# Patient Record
Sex: MALE | Race: WHITE | URBAN - METROPOLITAN AREA
[De-identification: names, ages, dates, MRNs, and addresses within clinical notes are randomized per-mention and may not be internally consistent; named-entity substitution may affect disease eponyms.]

---

## 2017-04-21 ENCOUNTER — OFFICE VISIT (OUTPATIENT)
Dept: INTERNAL MEDICINE CLINIC | Age: 72
End: 2017-04-21

## 2017-04-21 VITALS
TEMPERATURE: 98.7 F | WEIGHT: 203 LBS | BODY MASS INDEX: 30.07 KG/M2 | HEIGHT: 69 IN | SYSTOLIC BLOOD PRESSURE: 143 MMHG | OXYGEN SATURATION: 97 % | RESPIRATION RATE: 18 BRPM | DIASTOLIC BLOOD PRESSURE: 77 MMHG | HEART RATE: 80 BPM

## 2017-04-21 DIAGNOSIS — F32.A DEPRESSION, UNSPECIFIED DEPRESSION TYPE: ICD-10-CM

## 2017-04-21 DIAGNOSIS — Z00.00 MEDICARE ANNUAL WELLNESS VISIT, SUBSEQUENT: ICD-10-CM

## 2017-04-21 DIAGNOSIS — E11.9 CONTROLLED TYPE 2 DIABETES MELLITUS WITHOUT COMPLICATION, WITHOUT LONG-TERM CURRENT USE OF INSULIN (HCC): ICD-10-CM

## 2017-04-21 DIAGNOSIS — I10 ESSENTIAL HYPERTENSION: Primary | ICD-10-CM

## 2017-04-21 RX ORDER — ASPIRIN 81 MG/1
TABLET ORAL DAILY
COMMUNITY
End: 2022-05-19

## 2017-04-21 RX ORDER — SERTRALINE HYDROCHLORIDE 50 MG/1
50 TABLET, FILM COATED ORAL DAILY
Qty: 30 TAB | Refills: 3 | Status: SHIPPED | OUTPATIENT
Start: 2017-04-21 | End: 2017-08-05 | Stop reason: SDUPTHER

## 2017-04-21 RX ORDER — AMLODIPINE BESYLATE 10 MG/1
10 TABLET ORAL DAILY
COMMUNITY
End: 2017-11-27 | Stop reason: SDUPTHER

## 2017-04-21 RX ORDER — MELATONIN
DAILY
COMMUNITY
End: 2020-11-23 | Stop reason: DRUGHIGH

## 2017-04-21 RX ORDER — SERTRALINE HYDROCHLORIDE 50 MG/1
TABLET, FILM COATED ORAL DAILY
COMMUNITY
End: 2017-04-21 | Stop reason: SDUPTHER

## 2017-04-21 RX ORDER — ALLOPURINOL 300 MG/1
300 TABLET ORAL DAILY
COMMUNITY
End: 2018-12-18 | Stop reason: SDUPTHER

## 2017-04-21 RX ORDER — LOSARTAN POTASSIUM AND HYDROCHLOROTHIAZIDE 12.5; 1 MG/1; MG/1
1 TABLET ORAL DAILY
COMMUNITY
End: 2017-11-27 | Stop reason: SDUPTHER

## 2017-04-21 RX ORDER — TESTOSTERONE 20.25 MG/1.25G
GEL TOPICAL DAILY
COMMUNITY
End: 2017-07-28 | Stop reason: SDUPTHER

## 2017-04-21 NOTE — PROGRESS NOTES
History:   Josias Farrar is a 73 y.o. male presenting today for an initial visit for Establish Care; Diabetes; Hypertension; and Annual Wellness Visit  He's been here a month so needs a new PCP. Is from [place]. He has a son who lives here. 1.  DM:  On Januvia. Down to 1/2 pill every other day. Got swelling in his face with metformin, so that was stopped. He was getting blood work every 3 months. He just lost almost 15 lbs. Last blood work was about 3 months ago. Doesn't check sugars at home. 2.  HTN:  Controlled with Norvasc and Hyzaar. He gets a little white coat HTN. [facility] and around 100/70.      3.  H/o colon CA:  Was surgically removed 12 years ago. Had chemo and radiation too. Last colonoscopy was last year. He thinks his next one is 5 years from then. 4.  Depression:  Was put on Zoloft by his oncologist.  He's still grieving his wife. He's doing ok and has a dog. Not worrying as much as he was. He denies SI.      5.  Hypogonadism:  He's taking Androgel. Has been on it for a number of years. He has been getting PSAs every 3 months, but not getting rectal exams and doesn't want those. 6.  Gout:  On allopurinol with good results. Was on 300 mg every day. Then lowered to 1/2 pill 3 times/week. 7.  H/O CRI:  Therefore on lower doses of medications. Saw a nephrologist once. Told him not to take Aleve. Review of Systems   Constitutional: Negative. HENT: Negative. Eyes:        Has h/o cataracts and torn retina   Respiratory: Negative. Cardiovascular: Negative. Gastrointestinal: Negative. Genitourinary: Positive for frequency (nocturia x2-3). Musculoskeletal: Positive for joint pain (a little in hands). Skin: Negative. Neurological: Negative.     Psychiatric/Behavioral:        Grieving       Past Medical History:   Diagnosis Date    [facility]     Diabetes ([facility]     Hypertension        Past Surgical History:   Procedure Laterality Date    ABDOMEN SURGERY PROC UNLISTED      HX COLONOSCOPY         Social History     Social History    Marital status:      Spouse name: N/A    Number of children: N/A    Years of education: N/A     Occupational History    Not on file. Social History Main Topics    Smoking status: Never Smoker    Smokeless tobacco: Never Used    Alcohol use No    Drug use: Not on file    Sexual activity: No     Other Topics Concern    Not on file     Social History Narrative    No narrative on file       Family History   Problem Relation Age of Onset    Cancer Mother     No Known Problems Father        No current outpatient prescriptions on file prior to visit. No current facility-administered medications on file prior to visit. No Known Allergies    Objective:   VS:    Visit Vitals    /77 (BP 1 Location: Left arm, BP Patient Position: Sitting)    Pulse 80    Temp 98.7 °F (37.1 °C) (Oral)    Resp 18    Ht 5' 9\" (1.753 m)    Wt 203 lb (92.1 kg)    SpO2 97%    BMI 29.98 kg/m2     Physical Exam   Constitutional: He is oriented to person, place, and time. He appears well-developed and well-nourished. No distress. HENT:   Head: Normocephalic and atraumatic. Right Ear: External ear normal.   Left Ear: External ear normal.   Nose: Nose normal.   Mouth/Throat: Oropharynx is clear and moist.   Eyes: Conjunctivae and EOM are normal. Pupils are equal, round, and reactive to light. No scleral icterus. Neck: Normal range of motion. Neck supple. No tracheal deviation present. No thyromegaly present. Cardiovascular: Normal rate, regular rhythm, normal heart sounds and intact distal pulses. Exam reveals no gallop and no friction rub. No murmur heard. Pulmonary/Chest: Effort normal and breath sounds normal. He has no wheezes. He has no rales. Abdominal: Soft. Bowel sounds are normal. He exhibits no distension. There is no hepatosplenomegaly. There is no tenderness. Musculoskeletal: Normal range of motion. He exhibits no edema or tenderness. Lymphadenopathy:     He has no cervical adenopathy. Neurological: He is alert and oriented to person, place, and time. Skin: Skin is warm and dry. No rash noted. No pallor. Psychiatric: He has a normal mood and affect. His behavior is normal. Judgment and thought content normal.   Nursing note and vitals reviewed. Assessment/ Plan:     Melchor Wilkins was seen today for establish care, diabetes, hypertension and annual wellness visit. Diagnoses and all orders for this visit:    Essential hypertension  -     METABOLIC PANEL, COMPREHENSIVE; Future  -     CBC WITH AUTOMATED DIFF; Future    Controlled type 2 diabetes mellitus without complication, without long-term current use of insulin (HCC)  -     HEMOGLOBIN A1C WITH EAG; Future  -     METABOLIC PANEL, COMPREHENSIVE; Future    Depression, unspecified depression type  -     sertraline (ZOLOFT) 50 mg tablet; Take 1 Tab by mouth daily. I have discussed the diagnosis with the patient and the intended plan as seen in the above orders. The patient verbalized understanding and agrees with the plan. Follow-up Disposition:  Return in about 3 months (around 7/21/2017) for combo clinic.     Mikael Head MD

## 2017-04-21 NOTE — PROGRESS NOTES
Medicare Wellness Visit  Kerline Akers is a 73 y.o. male and presents for annual Medicare Wellness Visit. Problem List: Reviewed with patient and discussed risk factors. There is no problem list on file for this patient. Current medical providers:  No care team member to display    PSH: Reviewed with patient  Past Surgical History:   Procedure Laterality Date    ABDOMEN SURGERY PROC UNLISTED      HX COLONOSCOPY          SH: Reviewed with patient  Social History   Substance Use Topics    Smoking status: Never Smoker    Smokeless tobacco: Never Used    Alcohol use No       FH: Reviewed with patient  Family History   Problem Relation Age of Onset    Cancer Mother     No Known Problems Father        Medications/Allergies: Reviewed with patient  No current outpatient prescriptions on file prior to visit. No current facility-administered medications on file prior to visit. No Known Allergies    Objective:  Visit Vitals    /77 (BP 1 Location: Left arm, BP Patient Position: Sitting)    Pulse 80    Temp 98.7 °F (37.1 °C) (Oral)    Resp 18    Ht 5' 9\" (1.753 m)    Wt 203 lb (92.1 kg)    SpO2 97%    BMI 29.98 kg/m2    Body mass index is 29.98 kg/(m^2). Assessment of cognitive impairment: Alert and oriented x 3    Depression Screen:   PHQ 2 / 9, over the last two weeks 4/21/2017   Little interest or pleasure in doing things Not at all   Feeling down, depressed or hopeless Not at all   Total Score PHQ 2 0       Fall Risk Assessment:    Fall Risk Assessment, last 12 mths 4/21/2017   Able to walk? Yes   Fall in past 12 months? No       Functional Ability:   Does the patient exhibit a steady gait? yes   How long did it take the patient to get up and walk from a sitting position? <10 sec   Is the patient self reliant?  (ie can do own laundry, meals, household chores)  yes     Does the patient handle his/her own medications? yes     Does the patient handle his/her own money?    yes Is the patients home safe (ie good lighting, handrails on stairs and bath, etc.)? yes     Did you notice or did patient express any hearing difficulties? no     Did you notice or did patient express any vision difficulties?   no     Were distance and reading eye charts used? no       Advance Care Planning:   Patient was offered the opportunity to discuss advance care planning:  yes     Does patient have an Advance Directive:  yes   If no, did you provide information on Caring Connections?  no   He will give us a copy. Plan:      Orders Placed This Encounter    amLODIPine (NORVASC) 10 mg tablet    losartan-hydroCHLOROthiazide (HYZAAR) 100-12.5 mg per tablet    allopurinol (ZYLOPRIM) 300 mg tablet    testosterone (ANDROGEL) 20.25 mg/1.25 gram (1.62 %) gel    SITagliptin (JANUVIA) 100 mg tablet    aspirin delayed-release 81 mg tablet    cholecalciferol (VITAMIN D3) 1,000 unit tablet    sertraline (ZOLOFT) 50 mg tablet   Will await old records to see about HM due    Health Maintenance   Topic Date Due    FOBT Q 1 YEAR AGE 50-75  08/31/2017 (Originally 8/9/1997)    Pneumococcal 65+ Low/Medium Risk (2 of 2 - PPSV23) 04/21/2018    MEDICARE YEARLY EXAM  04/22/2018    GLAUCOMA SCREENING Q2Y  11/14/2018    DTaP/Tdap/Td series (2 - Td) 04/21/2027    ZOSTER VACCINE AGE 60>  Addressed    INFLUENZA AGE 9 TO ADULT  Addressed       *Patient verbalized understanding and agreement with the plan. A copy of the After Visit Summary with personalized health plan was given to the patient today.

## 2017-04-21 NOTE — PATIENT INSTRUCTIONS

## 2017-04-21 NOTE — ACP (ADVANCE CARE PLANNING)
Advance Care Planning (ACP) Provider Conversation Snapshot    Date of ACP Conversation: 04/21/17  Persons included in Conversation:  patient  Length of ACP Conversation in minutes:  <16 minutes (Non-Billable)    Authorized Decision Maker (if patient is incapable of making informed decisions):    This person is:   self          For Patients with Decision Making Capacity:   Values/Goals: Exploration of values, goals, and preferences if recovery is not expected, even with continued medical treatment in the event of:  Imminent death    Conversation Outcomes / Follow-Up Plan:   he has ACP and will bring in

## 2017-04-21 NOTE — MR AVS SNAPSHOT
Visit Information Date & Time Provider Department Dept. Phone Encounter #  
 4/21/2017 11:15 AM Ortiz Garvey MD [facility]  Follow-up Instructions Return in about 3 months (around 7/21/2017) for combo clinic. Upcoming Health Maintenance Date Due FOBT Q 1 YEAR AGE 50-75 8/31/2017* Pneumococcal 65+ Low/Medium Risk (2 of 2 - PPSV23) 4/21/2018 MEDICARE YEARLY EXAM 4/22/2018 GLAUCOMA SCREENING Q2Y 11/14/2018 DTaP/Tdap/Td series (2 - Td) 4/21/2027 *Topic was postponed. The date shown is not the original due date. Allergies as of 4/21/2017  Review Complete On: 4/21/2017 By: Ortiz Garvey MD  
 No Known Allergies Current Immunizations  Never Reviewed No immunizations on file. Not reviewed this visit You Were Diagnosed With   
  
 Codes Comments Essential hypertension    -  Primary ICD-10-CM: I10 
ICD-9-CM: 401.9 Controlled type 2 diabetes mellitus without complication, without long-term current use of insulin [facility]     ICD-10-CM: E11.9 ICD-9-CM: 250.00 Depression, unspecified depression type     ICD-10-CM: F32.9 ICD-9-CM: 780 Medicare annual wellness visit, subsequent     ICD-10-CM: Z00.00 ICD-9-CM: V70.0 Vitals BP Pulse Temp Resp Height(growth percentile) Weight(growth percentile) 143/77 (BP 1 Location: Left arm, BP Patient Position: Sitting) 80 98.7 °F (37.1 °C) (Oral) 18 5' 9\" (1.753 m) 203 lb (92.1 kg) SpO2 BMI Smoking Status 97% 29.98 kg/m2 Never Smoker BMI and BSA Data Body Mass Index Body Surface Area  
 29.98 kg/m 2 2.12 m 2 Preferred Pharmacy [facility]acy Name Phone [facility] [address] [place] [address] [place] [facility] [phone] Your Updated Medication List  
  
   
This list is accurate as of: 4/21/17 12:22 PM.  Always use your most recent med list.  
  
  
  
  
 allopurinol 300 mg tablet Commonly known as:  Baldev Gagnon Take 300 mg by mouth daily. Indications: 1/2 tab three times a week  
  
 amLODIPine 10 mg tablet Commonly known as:  Scarlett Fast Take 10 mg by mouth daily. aspirin delayed-release 81 mg tablet Take  by mouth daily. JANUVIA 100 mg tablet Generic drug:  SITagliptin Take 100 mg by mouth daily. Indications: 1/2 pill every other day  
  
 losartan-hydroCHLOROthiazide 100-12.5 mg per tablet Commonly known as:  HYZAAR Take 1 Tab by mouth daily. sertraline 50 mg tablet Commonly known as:  ZOLOFT Take 1 Tab by mouth daily. testosterone 20.25 mg/1.25 gram (1.62 %) gel Commonly known as:  ANDROGEL  
by TransDERmal route daily. VITAMIN D3 1,000 unit tablet Generic drug:  cholecalciferol Take  by mouth daily. Prescriptions Sent to Pharmacy Refills  
 sertraline (ZOLOFT) 50 mg tablet 3 Sig: Take 1 Tab by mouth daily. Class: Normal  
 Pharmacy: [address] [place] [address], [address] [place] [address] Ph #: [phone] Route: Oral  
  
Follow-up Instructions Return in about 3 months (around 7/21/2017) for combo clinic. To-Do List   
 04/21/2017 Lab:  CBC WITH AUTOMATED DIFF   
  
 04/21/2017 Lab:  HEMOGLOBIN A1C WITH EAG   
  
 04/21/2017 Lab:  METABOLIC PANEL, COMPREHENSIVE Patient Instructions Learning About Diabetes Food Guidelines Your Care Instructions Meal planning is important to manage diabetes. It helps keep your blood sugar at a target level (which you set with your doctor). You don't have to eat special foods. You can eat what your family eats, including sweets once in a while. But you do have to pay attention to how often you eat and how much you eat of certain foods. You may want to work with a dietitian or a certified diabetes educator (CDE) to help you plan meals and snacks. A dietitian or CDE can also help you lose weight if that is one of your goals. What should you know about eating carbs? Managing the amount of carbohydrate (carbs) you eat is an important part of healthy meals when you have diabetes. Carbohydrate is found in many foods. · Learn which foods have carbs. And learn the amounts of carbs in different foods. ¨ Bread, cereal, pasta, and rice have about 15 grams of carbs in a serving. A serving is 1 slice of bread (1 ounce), ½ cup of cooked cereal, or 1/3 cup of cooked pasta or rice. ¨ Fruits have 15 grams of carbs in a serving. A serving is 1 small fresh fruit, such as an apple or orange; ½ of a banana; ½ cup of cooked or canned fruit; ½ cup of fruit juice; 1 cup of melon or raspberries; or 2 tablespoons of dried fruit. ¨ Milk and no-sugar-added yogurt have 15 grams of carbs in a serving. A serving is 1 cup of milk or 2/3 cup of no-sugar-added yogurt. ¨ Starchy vegetables have 15 grams of carbs in a serving. A serving is ½ cup of mashed potatoes or sweet potato; 1 cup winter squash; ½ of a small baked potato; ½ cup of cooked beans; or ½ cup cooked corn or green peas. · Learn how much carbs to eat each day and at each meal. A dietitian or CDE can teach you how to keep track of the amount of carbs you eat. This is called carbohydrate counting. · If you are not sure how to count carbohydrate grams, use the Plate Method to plan meals. It is a good, quick way to make sure that you have a balanced meal. It also helps you spread carbs throughout the day. ¨ Divide your plate by types of foods. Put non-starchy vegetables on half the plate, meat or other protein food on one-quarter of the plate, and a grain or starchy vegetable in the final quarter of the plate.  To this you can add a small piece of fruit and 1 cup of milk or yogurt, depending on how many carbs you are supposed to eat at a meal. 
· Try to eat about the same amount of carbs at each meal. Do not \"save up\" your daily allowance of carbs to eat at one meal. 
 · Proteins have very little or no carbs per serving. Examples of proteins are beef, chicken, turkey, fish, eggs, tofu, cheese, cottage cheese, and peanut butter. A serving size of meat is 3 ounces, which is about the size of a deck of cards. Examples of meat substitute serving sizes (equal to 1 ounce of meat) are 1/4 cup of cottage cheese, 1 egg, 1 tablespoon of peanut butter, and ½ cup of tofu. How can you eat out and still eat healthy? · Learn to estimate the serving sizes of foods that have carbohydrate. If you measure food at home, it will be easier to estimate the amount in a serving of restaurant food. · If the meal you order has too much carbohydrate (such as potatoes, corn, or baked beans), ask to have a low-carbohydrate food instead. Ask for a salad or green vegetables. · If you use insulin, check your blood sugar before and after eating out to help you plan how much to eat in the future. · If you eat more carbohydrate at a meal than you had planned, take a walk or do other exercise. This will help lower your blood sugar. What else should you know? · Limit saturated fat, such as the fat from meat and dairy products. This is a healthy choice because people who have diabetes are at higher risk of heart disease. So choose lean cuts of meat and nonfat or low-fat dairy products. Use olive or canola oil instead of butter or shortening when cooking. · Don't skip meals. Your blood sugar may drop too low if you skip meals and take insulin or certain medicines for diabetes. · Check with your doctor before you drink alcohol. Alcohol can cause your blood sugar to drop too low. Alcohol can also cause a bad reaction if you take certain diabetes medicines. Follow-up care is a key part of your treatment and safety. Be sure to make and go to all appointments, and call your doctor if you are having problems. It's also a good idea to know your test results and keep a list of the medicines you take. Where can you learn more? Go to http://yamila-jorge.info/. Enter O728 in the search box to learn more about \"Learning About Diabetes Food Guidelines. \" Current as of: May 23, 2016 Content Version: 11.2 © 9239-6512 [employer]. Care instructions adapted under license by [employer] (which disclaims liability or warranty for this information). If you have questions about a medical condition or this instruction, always ask your healthcare professional. [address] any warranty or liability for your use of this information. Introducing [facility]! Joy Marquis introduces [employer] patient portal. Now you can access parts of your medical record, email your doctor's office, and request medication refills online. 1. In your internet browser, go to [email or web address] 2. Click on the First Time User? Click Here link in the Sign In box. You will see the New Member Sign Up page. 3. Enter your [employer] Access Code exactly as it appears below. You will not need to use this code after youve completed the sign-up process. If you do not sign up before the expiration date, you must request a new code. · [employer] Access Code: [email or web address] Expires: 7/20/2017 12:22 PM 
 
4. Enter the last four digits of your Social Security Number (xxxx) and Date of Birth (mm/dd/yyyy) as indicated and click Submit. You will be taken to the next sign-up page. 5. Create a [employer]ent ID. This will be your [employer] login ID and cannot be changed, so think of one that is secure and easy to remember. 6. Create a [employer] password. You can change your password at any time. 7. Enter your Password Reset Question and Answer. This can be used at a later time if you forget your password. 8. Enter your e-mail address. You will receive e-mail notification when new information is available in [address] 9. Click Sign Up. You can now view and download portions of your medical record. 10. Click the Download Summary menu link to download a portable copy of your medical information. If you have questions, please visit the Frequently Asked Questions section of the [employer] website. Remember, [employer] is NOT to be used for urgent needs. For medical emergencies, dial 911. Now available from your iPhone and Android! Please provide this summary of care documentation to your next provider. If you have any questions after today's visit, please call [phone].

## 2017-04-21 NOTE — PROGRESS NOTES
Esme Varghese presents today at the clinic for      Chief Complaint   Patient presents with   [address] Diabetes    Hypertension    Annual Wellness Visit        Wt Readings from Last 3 Encounters:   04/21/17 203 lb (92.1 kg)     Temp Readings from Last 3 Encounters:   04/21/17 98.7 °F (37.1 °C) (Oral)     BP Readings from Last 3 Encounters:   04/21/17 143/77     Pulse Readings from Last 3 Encounters:   04/21/17 80       There are no preventive care reminders to display for this patient. Advance Directive:    1. Do you have an advance directive in place? Patient Reply: No    2. If not, would you like material regarding how to put one in place?   Patient Reply: Pt has one he will bring his copy        ADL Assessment 4/21/2017   Feeding yourself No Help Needed   Getting from bed to chair No Help Needed   Getting dressed No Help Needed   Bathing or showering No Help Needed   Walk across the room (includes cane/walker) No Help Needed   Using the telphone No Help Needed   Taking your medications No Help Needed   Preparing meals No Help Needed   Managing money (expenses/bills) No Help Needed   Moderately strenuous housework (laundry) No Help Needed   Shopping for personal items (toiletries/medicines) No Help Needed   Shopping for groceries No Help Needed   Driving No Help Needed   Climbing a flight of stairs No Help Needed   Getting to places beyond walking distances No Help Needed

## 2017-05-22 PROBLEM — M10.00 IDIOPATHIC GOUT: Status: ACTIVE | Noted: 2017-05-22

## 2017-05-22 PROBLEM — F51.01 PRIMARY INSOMNIA: Status: ACTIVE | Noted: 2017-05-22

## 2017-05-22 PROBLEM — E11.42 CONTROLLED TYPE 2 DIABETES MELLITUS WITH DIABETIC POLYNEUROPATHY, WITHOUT LONG-TERM CURRENT USE OF INSULIN (HCC): Status: ACTIVE | Noted: 2017-05-22

## 2017-05-22 PROBLEM — Z85.048 HISTORY OF RECTAL CANCER: Status: ACTIVE | Noted: 2017-05-22

## 2017-05-22 PROBLEM — N18.9 CKD (CHRONIC KIDNEY DISEASE): Status: ACTIVE | Noted: 2017-05-22

## 2017-05-22 PROBLEM — I10 ESSENTIAL HYPERTENSION: Status: ACTIVE | Noted: 2017-05-22

## 2017-05-22 PROBLEM — F32.9 REACTIVE DEPRESSION: Status: ACTIVE | Noted: 2017-05-22

## 2017-07-25 ENCOUNTER — OFFICE VISIT (OUTPATIENT)
Dept: INTERNAL MEDICINE CLINIC | Age: 72
End: 2017-07-25

## 2017-07-25 VITALS
HEART RATE: 83 BPM | SYSTOLIC BLOOD PRESSURE: 128 MMHG | BODY MASS INDEX: 29.18 KG/M2 | HEIGHT: 69 IN | TEMPERATURE: 97.6 F | WEIGHT: 197 LBS | RESPIRATION RATE: 18 BRPM | DIASTOLIC BLOOD PRESSURE: 82 MMHG | OXYGEN SATURATION: 96 %

## 2017-07-25 DIAGNOSIS — I10 ESSENTIAL HYPERTENSION: ICD-10-CM

## 2017-07-25 DIAGNOSIS — N18.30 CRI (CHRONIC RENAL INSUFFICIENCY), STAGE 3 (MODERATE) (HCC): ICD-10-CM

## 2017-07-25 DIAGNOSIS — Z12.5 SCREENING FOR PROSTATE CANCER: ICD-10-CM

## 2017-07-25 DIAGNOSIS — F32.A DEPRESSION, UNSPECIFIED DEPRESSION TYPE: ICD-10-CM

## 2017-07-25 DIAGNOSIS — M1A.9XX0 CHRONIC GOUT WITHOUT TOPHUS, UNSPECIFIED CAUSE, UNSPECIFIED SITE: ICD-10-CM

## 2017-07-25 DIAGNOSIS — E34.9 TESTOSTERONE DEFICIENCY: ICD-10-CM

## 2017-07-25 DIAGNOSIS — E11.9 CONTROLLED TYPE 2 DIABETES MELLITUS WITHOUT COMPLICATION, WITHOUT LONG-TERM CURRENT USE OF INSULIN (HCC): Primary | ICD-10-CM

## 2017-07-25 NOTE — MR AVS SNAPSHOT
Visit Information Date & Time Provider Department Dept. Phone Encounter #  
 7/25/2017  8:00 AM Reina Fairbanks MD [facility] [phone] Follow-up Instructions Return in about 3 months (around 10/25/2017) for combo clinic, depression. Upcoming Health Maintenance Date Due Hepatitis C Screening 1945 HEMOGLOBIN A1C Q6M 1945 FOOT EXAM Q1 8/9/1957 EYE EXAM RETINAL OR DILATED Q1 8/9/1957 FOBT Q 1 YEAR AGE 50-75 8/31/2017* INFLUENZA AGE 9 TO ADULT 8/1/2017 MICROALBUMIN Q1 12/9/2017 LIPID PANEL Q1 12/9/2017 Pneumococcal 65+ Low/Medium Risk (2 of 2 - PPSV23) 4/21/2018 MEDICARE YEARLY EXAM 4/22/2018 GLAUCOMA SCREENING Q2Y 11/14/2018 DTaP/Tdap/Td series (2 - Td) 4/21/2027 *Topic was postponed. The date shown is not the original due date. Allergies as of 7/25/2017  Review Complete On: 7/25/2017 By: Reina Fairbanks MD  
 No Known Allergies Current Immunizations  Never Reviewed No immunizations on file. Not reviewed this visit You Were Diagnosed With   
  
 Codes Comments Controlled type 2 diabetes mellitus without complication, without long-term current use of insulin ([facility]    -  Primary ICD-10-CM: E11.9 ICD-9-CM: 250.00 Essential hypertension     ICD-10-CM: I10 
ICD-9-CM: 401.9 Depression, unspecified depression type     ICD-10-CM: F32.9 ICD-9-CM: 158 Chronic gout without tophus, unspecified cause, unspecified site     ICD-10-CM: M1A. 9XX0 
ICD-9-CM: 274.02 Testosterone deficiency     ICD-10-CM: E29.1 ICD-9-CM: 257.2 CRI (chronic renal insufficiency), stage 3 (moderate)     ICD-10-CM: N18.3 ICD-9-CM: 134. 3 Screening for prostate cancer     ICD-10-CM: Z12.5 ICD-9-CM: V76.44 Vitals BP Pulse Temp Resp Height(growth percentile) Weight(growth percentile)  128/82 (BP 1 Location: Left arm, BP Patient Position: Sitting) 83 97.6 °F (36.4 °C) (Oral) 18 5' 9\" (1.753 m) 197 lb (89.4 kg) SpO2 BMI Smoking Status 96% 29.09 kg/m2 Never Smoker Vitals History BMI and BSA Data Body Mass Index Body Surface Area  
 29.09 kg/m 2 2.09 m 2 Preferred Pharmacy Pharmacy Name Phone [facility] [address] [address] [facility] [phone] Your Updated Medication List  
  
   
This list is accurate as of: 7/25/17  8:48 AM.  Always use your most recent med list.  
  
  
  
  
 allopurinol 300 mg tablet Commonly known as:  Vena Ditch Take 300 mg by mouth daily. Indications: 1/2 tab three times a week  
  
 amLODIPine 10 mg tablet Commonly known as:  João Matar Take 10 mg by mouth daily. aspirin delayed-release 81 mg tablet Take  by mouth daily. JANUVIA 100 mg tablet Generic drug:  SITagliptin Take 100 mg by mouth daily. Indications: 1/2 pill every other day  
  
 losartan-hydroCHLOROthiazide 100-12.5 mg per tablet Commonly known as:  HYZAAR Take 1 Tab by mouth daily. sertraline 50 mg tablet Commonly known as:  ZOLOFT Take 1 Tab by mouth daily. testosterone 20.25 mg/1.25 gram (1.62 %) gel Commonly known as:  ANDROGEL  
by TransDERmal route daily. VITAMIN D3 1,000 unit tablet Generic drug:  cholecalciferol Take  by mouth daily. Follow-up Instructions Return in about 3 months (around 10/25/2017) for combo clinic, depression. To-Do List   
 07/25/2017 Lab:  CBC WITH AUTOMATED DIFF   
  
 07/25/2017 Lab:  HEMOGLOBIN A1C WITH EAG   
  
 07/25/2017 Lab:  LIPID PANEL   
  
 07/25/2017 Lab:  METABOLIC PANEL, COMPREHENSIVE   
  
 07/25/2017 Lab:  PSA SCREENING (SCREENING)   
  
 07/25/2017 Lab:  TESTOSTERONE, FREE & TOTAL   
  
 07/25/2017 Lab:  TSH 3RD GENERATION   
  
 07/25/2017 Lab:  URIC ACID Patient Instructions 1.  You can cut down on the Allopurinol to 1/2 tablet 2 times per week. Introducing [address]! [facility] introduces [employer] patient portal. Now you can access parts of your medical record, email your doctor's office, and request medication refills online. 1. In your internet browser, go to [email or web address] 2. Click on the First Time User? Click Here link in the Sign In box. You will see the New Member Sign Up page. 3. Enter your [employer] Access Code exactly as it appears below. You will not need to use this code after youve completed the sign-up process. If you do not sign up before the expiration date, you must request a new code. · [employer] Access Code: [email or web address] Expires: 10/23/2017  8:47 AM 
 
4. Enter the last four digits of your Social Security Number (xxxx) and Date of Birth (mm/dd/yyyy) as indicated and click Submit. You will be taken to the next sign-up page. 5. Create a Mobile [employer] Services ID. This will be your [employer] login ID and cannot be changed, so think of one that is secure and easy to remember. 6. Create a [employer] password. You can change your password at any time. 7. Enter your Password Reset Question and Answer. This can be used at a later time if you forget your password. 8. Enter your e-mail address. You will receive e-mail notification when new information is available in [address] 9. Click Sign Up. You can now view and download portions of your medical record. 10. Click the Download Summary menu link to download a portable copy of your medical information. If you have questions, please visit the Frequently Asked Questions section of the [employer] website. Remember, [employer] is NOT to be used for urgent needs. For medical emergencies, dial 911. Now available from your iPhone and Android! Please provide this summary of care documentation to your next provider. If you have any questions after today's visit, please call [phone].

## 2017-07-25 NOTE — PROGRESS NOTES
Chief Complaint   Patient presents with    Hypertension    Diabetes     1. Have you been to the ER, urgent care clinic since your last visit? Hospitalized since your last visit? No    2. Have you seen or consulted any other health care providers outside of the [address] [place] [address] since your last visit? Include any pap smears or colon screening.  No

## 2017-07-25 NOTE — PROGRESS NOTES
Subjective:   Patient is a 73y.o. year old male who presents for Hypertension and Diabetes  1. DM:  He's lost 6 lbs since last time and feels good. He's trying to lose weight and going for target of 175. He's doing this through dietary changes, eating lower portions, and exercising. Occasionally cheats but not as a habit. On Januvia. Can't take Metformin. 2.  HTN:  BP well controlled on Norvasc and Hyzaar    3. Grief/Depression:  Still grieving over the loss of his wife. He got good news that his daughter will be moving her with her family. He's really close to her. He also has some planned fun trips coming up. He is slowly improving, but it's been less than a year since she . He's still on Zoloft 50 mg every day. 4.  Gout:  He's on 150 mg three times per week. Not having any gout attacks. Asks if he can stop it. Last gout attack was 6-7 years ago. 5.  H/o colon CA:  Last colonoscopy was in 2016. Next colonoscopy needs to be done in .    6.  Testosterone deficiency:  He has been applying 1 pump on each arm daily (total 2 pumps per day). (1.62%, 20.25 mg, 75 g pump)      ROS    Current Outpatient Prescriptions on File Prior to Visit   Medication Sig Dispense Refill    amLODIPine (NORVASC) 10 mg tablet Take 10 mg by mouth daily.  losartan-hydroCHLOROthiazide (HYZAAR) 100-12.5 mg per tablet Take 1 Tab by mouth daily.  allopurinol (ZYLOPRIM) 300 mg tablet Take 300 mg by mouth daily. Indications: 1/2 tab three times a week      testosterone (ANDROGEL) 20.25 mg/1.25 gram (1.62 %) gel by TransDERmal route daily.  SITagliptin (JANUVIA) 100 mg tablet Take 100 mg by mouth daily. Indications: 1/2 pill every other day      aspirin delayed-release 81 mg tablet Take  by mouth daily.  cholecalciferol (VITAMIN D3) 1,000 unit tablet Take  by mouth daily.  sertraline (ZOLOFT) 50 mg tablet Take 1 Tab by mouth daily.  30 Tab 3     No current facility-administered medications on file prior to visit. Reviewed PmHx, RxHx, FmHx, SocHx, AllgHx and updated and dated in the chart. Nurse notes were reviewed and are correct    Objective:     Vitals:    07/25/17 0810   BP: 128/82   Pulse: 83   Resp: 18   Temp: 97.6 °F (36.4 °C)   TempSrc: Oral   SpO2: 96%   Weight: 197 lb (89.4 kg)   Height: 5' 9\" (1.753 m)     Physical Exam   Constitutional: He is oriented to person, place, and time. He appears well-developed and well-nourished. No distress. HENT:   Head: Normocephalic and atraumatic. Right Ear: External ear normal.   Left Ear: External ear normal.   Nose: Nose normal.   Mouth/Throat: Oropharynx is clear and moist.   Eyes: EOM are normal. Pupils are equal, round, and reactive to light. Neck: Normal range of motion. Neck supple. Carotid bruit is not present. No tracheal deviation present. Cardiovascular: Normal rate, regular rhythm, normal heart sounds and intact distal pulses. Exam reveals no gallop and no friction rub. No murmur heard. Pulmonary/Chest: Effort normal and breath sounds normal. He has no wheezes. He has no rales. Abdominal: Soft. Bowel sounds are normal. He exhibits no distension. There is no tenderness. Musculoskeletal: He exhibits no edema or tenderness. Lymphadenopathy:     He has no cervical adenopathy. Neurological: He is alert and oriented to person, place, and time. Skin: Skin is warm and dry. Psychiatric: He has a normal mood and affect. His behavior is normal.   Nursing note and vitals reviewed. Assessment/ Plan:     Leopoldo Milian was seen today for hypertension and diabetes. Diagnoses and all orders for this visit:    Controlled type 2 diabetes mellitus without complication, without long-term current use of insulin ([address]):  Last A1c 6.7. Checking today. Doing great with diet and exercise. Continue Januvia. Can't take metformin.  -     CBC WITH AUTOMATED DIFF; Future  -     METABOLIC PANEL, COMPREHENSIVE;  Future  - LIPID PANEL; Future  -     TSH 3RD GENERATION; Future  -     HEMOGLOBIN A1C WITH EAG; Future    Essential hypertension:  Well controlled. Continue current meds    -     CBC WITH AUTOMATED DIFF; Future    Depression, unspecified depression type: Well controlled. Continue current med  -     TSH 3RD GENERATION; Future    Chronic gout without tophus, unspecified cause, unspecified site:  Checking uric acid, but on low dose allopurinol and will wean further.  -     URIC ACID; Future    Testosterone deficiency:  Checking level. If good, will refill his Androgel at same dose (1 pump on each arm every day)  -     TESTOSTERONE, FREE & TOTAL; Future    CRI (chronic renal insufficiency), stage 3 (moderate): Checking today. Screening for prostate cancer  -     PSA - SCREENING (); Future     I have discussed the diagnosis with the patient and the intended plan as seen in the above orders. The patient verbalized understanding and agrees with the plan. Follow-up Disposition:  Return in about 3 months (around 10/25/2017) for combo clinic, depression.     Sherie Tobin MD

## 2017-07-26 ENCOUNTER — LAB ONLY (OUTPATIENT)
Dept: INTERNAL MEDICINE CLINIC | Age: 72
End: 2017-07-26

## 2017-07-26 DIAGNOSIS — E11.9 CONTROLLED TYPE 2 DIABETES MELLITUS WITHOUT COMPLICATION, WITHOUT LONG-TERM CURRENT USE OF INSULIN (HCC): Primary | ICD-10-CM

## 2017-07-28 DIAGNOSIS — E29.9 TESTICULAR DYSFUNCTION: Primary | ICD-10-CM

## 2017-07-28 LAB
ALBUMIN SERPL-MCNC: 4.2 G/DL (ref 3.6–4.8)
ALBUMIN/GLOB SERPL: 1.6 {RATIO} (ref 1.2–2.2)
ALP SERPL-CCNC: 57 IU/L (ref 39–117)
ALT SERPL-CCNC: 23 IU/L (ref 0–44)
AST SERPL-CCNC: 25 IU/L (ref 0–40)
BASOPHILS # BLD AUTO: 0 X10E3/UL (ref 0–0.2)
BASOPHILS NFR BLD AUTO: 0 %
BILIRUB SERPL-MCNC: 0.7 MG/DL (ref 0–1.2)
BUN SERPL-MCNC: 29 MG/DL (ref 8–27)
BUN/CREAT SERPL: 19 (ref 10–24)
CALCIUM SERPL-MCNC: 8.9 MG/DL (ref 8.6–10.2)
CHLORIDE SERPL-SCNC: 93 MMOL/L (ref 96–106)
CHOLEST SERPL-MCNC: 157 MG/DL (ref 100–199)
CO2 SERPL-SCNC: 26 MMOL/L (ref 18–29)
CREAT SERPL-MCNC: 1.55 MG/DL (ref 0.76–1.27)
EOSINOPHIL # BLD AUTO: 0.2 X10E3/UL (ref 0–0.4)
EOSINOPHIL NFR BLD AUTO: 3 %
ERYTHROCYTE [DISTWIDTH] IN BLOOD BY AUTOMATED COUNT: 14.5 % (ref 12.3–15.4)
EST. AVERAGE GLUCOSE BLD GHB EST-MCNC: 123 MG/DL
GLOBULIN SER CALC-MCNC: 2.6 G/DL (ref 1.5–4.5)
GLUCOSE SERPL-MCNC: 110 MG/DL (ref 65–99)
HBA1C MFR BLD: 5.9 % (ref 4.8–5.6)
HCT VFR BLD AUTO: 50.2 % (ref 37.5–51)
HDLC SERPL-MCNC: 47 MG/DL
HGB BLD-MCNC: 17.1 G/DL (ref 12.6–17.7)
IMM GRANULOCYTES # BLD: 0 X10E3/UL (ref 0–0.1)
IMM GRANULOCYTES NFR BLD: 0 %
LDLC SERPL CALC-MCNC: 90 MG/DL (ref 0–99)
LYMPHOCYTES # BLD AUTO: 1.7 X10E3/UL (ref 0.7–3.1)
LYMPHOCYTES NFR BLD AUTO: 21 %
MCH RBC QN AUTO: 29.4 PG (ref 26.6–33)
MCHC RBC AUTO-ENTMCNC: 34.1 G/DL (ref 31.5–35.7)
MCV RBC AUTO: 86 FL (ref 79–97)
MONOCYTES # BLD AUTO: 0.6 X10E3/UL (ref 0.1–0.9)
MONOCYTES NFR BLD AUTO: 7 %
NEUTROPHILS # BLD AUTO: 5.6 X10E3/UL (ref 1.4–7)
NEUTROPHILS NFR BLD AUTO: 69 %
PLATELET # BLD AUTO: 198 X10E3/UL (ref 150–379)
POTASSIUM SERPL-SCNC: 4.1 MMOL/L (ref 3.5–5.2)
PROT SERPL-MCNC: 6.8 G/DL (ref 6–8.5)
PSA SERPL-MCNC: 1.6 NG/ML (ref 0–4)
RBC # BLD AUTO: 5.81 X10E6/UL (ref 4.14–5.8)
SODIUM SERPL-SCNC: 134 MMOL/L (ref 134–144)
TESTOST FREE SERPL-MCNC: 11.7 PG/ML (ref 6.6–18.1)
TESTOST SERPL-MCNC: 580 NG/DL (ref 264–916)
TRIGL SERPL-MCNC: 98 MG/DL (ref 0–149)
TSH SERPL DL<=0.005 MIU/L-ACNC: 1.72 UIU/ML (ref 0.45–4.5)
URATE SERPL-MCNC: 5.6 MG/DL (ref 3.7–8.6)
VLDLC SERPL CALC-MCNC: 20 MG/DL (ref 5–40)
WBC # BLD AUTO: 8.1 X10E3/UL (ref 3.4–10.8)

## 2017-07-28 RX ORDER — TESTOSTERONE 20.25 MG/1.25G
40.5 GEL TOPICAL DAILY
Qty: 1 BOTTLE | Refills: 3 | Status: SHIPPED | OUTPATIENT
Start: 2017-07-28 | End: 2018-01-25 | Stop reason: SDUPTHER

## 2017-07-28 NOTE — PROGRESS NOTES
Tell him that his blood work looked good. His kidney function is stable. Cholesterol is excellent. A1c excellent (5.9). Testosterone is in normal range (580). TSH, PSA normal.  Continue same meds. I reordered the Androgel, but it printed here. You could call it in, or I can bring it on Monday, and he can pick it up then.

## 2017-07-28 NOTE — PROGRESS NOTES
Since his DM is so well controlled, I only need to see him every 6 months for that. So I'm fine with him coming back in 6 months unless he wants to be seen in 3 months for his depression.

## 2017-07-28 NOTE — PROGRESS NOTES
Pt aware of results. He will  prescription on Monday. He wanted to know if you wanted to see him every 6 months was good or did   You want to see him every 3. He will schedule on Monday with what you prefer.

## 2017-08-04 DIAGNOSIS — E11.9 CONTROLLED TYPE 2 DIABETES MELLITUS WITHOUT COMPLICATION, WITHOUT LONG-TERM CURRENT USE OF INSULIN (HCC): ICD-10-CM

## 2017-08-04 DIAGNOSIS — I10 ESSENTIAL HYPERTENSION: ICD-10-CM

## 2017-09-18 ENCOUNTER — LAB ONLY (OUTPATIENT)
Dept: INTERNAL MEDICINE CLINIC | Age: 72
End: 2017-09-18

## 2017-09-18 DIAGNOSIS — Z85.038 HISTORY OF COLON CANCER: Primary | ICD-10-CM

## 2017-09-19 LAB — CEA SERPL-MCNC: 0.9 NG/ML (ref 0–4.7)

## 2017-09-26 ENCOUNTER — CLINICAL SUPPORT (OUTPATIENT)
Dept: INTERNAL MEDICINE CLINIC | Age: 72
End: 2017-09-26

## 2017-09-26 DIAGNOSIS — Z23 ENCOUNTER FOR IMMUNIZATION: Primary | ICD-10-CM

## 2017-09-29 DIAGNOSIS — F32.A DEPRESSION, UNSPECIFIED DEPRESSION TYPE: ICD-10-CM

## 2017-09-30 RX ORDER — SERTRALINE HYDROCHLORIDE 50 MG/1
50 TABLET, FILM COATED ORAL DAILY
Qty: 90 TAB | Refills: 1 | Status: SHIPPED | OUTPATIENT
Start: 2017-09-30 | End: 2018-05-12 | Stop reason: SDUPTHER

## 2017-11-27 DIAGNOSIS — I10 ESSENTIAL HYPERTENSION: Primary | ICD-10-CM

## 2017-11-27 RX ORDER — LOSARTAN POTASSIUM AND HYDROCHLOROTHIAZIDE 12.5; 1 MG/1; MG/1
1 TABLET ORAL DAILY
Qty: 90 TAB | Refills: 1 | Status: SHIPPED | OUTPATIENT
Start: 2017-11-27 | End: 2018-05-18 | Stop reason: SDUPTHER

## 2017-11-27 RX ORDER — AMLODIPINE BESYLATE 10 MG/1
10 TABLET ORAL DAILY
Qty: 90 TAB | Refills: 1 | Status: SHIPPED | OUTPATIENT
Start: 2017-11-27 | End: 2018-05-03 | Stop reason: SDUPTHER

## 2018-01-26 ENCOUNTER — OFFICE VISIT (OUTPATIENT)
Dept: INTERNAL MEDICINE CLINIC | Age: 73
End: 2018-01-26

## 2018-01-26 VITALS
HEART RATE: 76 BPM | RESPIRATION RATE: 18 BRPM | BODY MASS INDEX: 29.18 KG/M2 | HEIGHT: 69 IN | SYSTOLIC BLOOD PRESSURE: 109 MMHG | DIASTOLIC BLOOD PRESSURE: 74 MMHG | TEMPERATURE: 98 F | WEIGHT: 197 LBS | OXYGEN SATURATION: 98 %

## 2018-01-26 DIAGNOSIS — I10 ESSENTIAL HYPERTENSION: ICD-10-CM

## 2018-01-26 DIAGNOSIS — E11.42 CONTROLLED TYPE 2 DIABETES MELLITUS WITH DIABETIC POLYNEUROPATHY, WITHOUT LONG-TERM CURRENT USE OF INSULIN (HCC): Primary | ICD-10-CM

## 2018-01-26 DIAGNOSIS — Z11.59 NEED FOR HEPATITIS C SCREENING TEST: ICD-10-CM

## 2018-01-26 DIAGNOSIS — E29.9 TESTICULAR DYSFUNCTION: ICD-10-CM

## 2018-01-26 PROBLEM — E11.21 TYPE 2 DIABETES MELLITUS WITH NEPHROPATHY (HCC): Status: ACTIVE | Noted: 2018-01-26

## 2018-01-26 LAB
CREAT SERPL-MCNC: 200 MG/DL
MICROALBUMIN UR TEST STR-MCNC: 150 MG/L
MICROALBUMIN/CREAT RATIO POC: <300 MG/G

## 2018-01-26 NOTE — MR AVS SNAPSHOT
Therisa Favia 
 
 
 [facility] [address] [facility] [address] 
[phone] Patient: Elva Wasserman MRN: [id number] :[birth year] Visit Information Date & Time Provider Department Dept. Phone Encounter #  
 2018  9:15 AM David De La Fuente MD [facility] [phone] [id number] Follow-up Instructions Return in about 6 months (around 2018) for combo clinic. Upcoming Health Maintenance Date Due Hepatitis C Screening 1945 FOOT EXAM Q1 1957 EYE EXAM RETINAL OR DILATED Q1 1957 FOBT Q 1 YEAR AGE 50-75 1997 MICROALBUMIN Q1 2017 HEMOGLOBIN A1C Q6M 2018 Pneumococcal 65+ Low/Medium Risk (2 of 2 - PPSV23) 2018 MEDICARE YEARLY EXAM 2018 LIPID PANEL Q1 2018 GLAUCOMA SCREENING Q2Y 2018 DTaP/Tdap/Td series (2 - Td) 2027 Allergies as of 2018  Review Complete On: 2018 By: David De La Fuente MD  
 No Known Allergies Current Immunizations  Never Reviewed Name Date Influenza High Dose Vaccine PF 2017 Not reviewed this visit You Were Diagnosed With   
  
 Codes Comments Controlled type 2 diabetes mellitus with diabetic polyneuropathy, without long-term current use of insulin ([facility])    -  Primary ICD-10-CM: E11.42 
ICD-9-CM: 250.60, 357.2 Essential hypertension     ICD-10-CM: I10 
ICD-9-CM: 401.9 Need for hepatitis C screening test     ICD-10-CM: Z11.59 
ICD-9-CM: V73.89 Testicular dysfunction     ICD-10-CM: E29.9 ICD-9-CM: 257.9 Vitals BP Pulse Temp Resp Height(growth percentile) Weight(growth percentile) 109/74 (BP 1 Location: Left arm, BP Patient Position: Sitting) 76 98 °F (36.7 °C) (Oral) 18 5' 9\" (1.753 m) 197 lb (89.4 kg) SpO2 BMI Smoking Status 98% 29.09 kg/m2 Never Smoker Vitals History BMI and BSA Data Body Mass Index Body Surface Area 29.09 kg/m 2 2.09 m 2 Preferred Pharmacy Pharmacy Name Phone [facility] [address] [facility] [place] [facility] [phone] Your Updated Medication List  
  
   
This list is accurate as of: 1/26/18  9:53 AM.  Always use your most recent med list.  
  
  
  
  
 allopurinol 300 mg tablet Commonly known as:  Charmayne Harts Take 300 mg by mouth daily. Indications: 1/2 tab three times a week  
  
 amLODIPine 10 mg tablet Commonly known as:  Jignesh Peace Take 1 Tab by mouth daily. ANDROGEL 20.25 mg/1.25 gram (1.62 %) gel Generic drug:  testosterone APPLY 2 PUMPS TO TRANSDERMAL ROUTE DAILY  
  
 aspirin delayed-release 81 mg tablet Take  by mouth daily. JANUVIA 100 mg tablet Generic drug:  SITagliptin Take 100 mg by mouth daily. Indications: 1/2 pill every other day  
  
 losartan-hydroCHLOROthiazide 100-12.5 mg per tablet Commonly known as:  HYZAAR Take 1 Tab by mouth daily. sertraline 50 mg tablet Commonly known as:  ZOLOFT Take 1 Tab by mouth daily. VITAMIN D3 1,000 unit tablet Generic drug:  cholecalciferol Take  by mouth daily. We Performed the Following AMB POC URINE, MICROALBUMIN, SEMIQUANTITATIVE [88484 CPT(R)] HM DIABETES FOOT EXAM [HM7 Custom] Follow-up Instructions Return in about 6 months (around 7/26/2018) for combo clinic. To-Do List   
 01/26/2018 Lab:  CBC WITH AUTOMATED DIFF   
  
 01/26/2018 Lab:  HEMOGLOBIN A1C WITH EAG   
  
 01/26/2018 Lab:  HEPATITIS C AB   
  
 01/26/2018 Lab:  METABOLIC PANEL, COMPREHENSIVE Introducing [facility]! Carmelita Fothergill introduces [employer] patient portal. Now you can access parts of your medical record, email your doctor's office, and request medication refills online. 1. In your internet browser, go to [email or web address] 2. Click on the First Time User? Click Here link in the Sign In box.  You will see the New Member Sign Up page. 3. Enter your iSpye Access Code exactly as it appears below. You will not need to use this code after youve completed the sign-up process. If you do not sign up before the expiration date, you must request a new code. · [employer]e Access Code: [email or web address] Expires: 4/26/2018  9:53 AM 
 
4. Enter the last four digits of your Social Security Number (xxxx) and Date of Birth (mm/dd/yyyy) as indicated and click Submit. You will be taken to the next sign-up page. 5. Create a iSpye ID. This will be your iSpye login ID and cannot be changed, so think of one that is secure and easy to remember. 6. Create a iS[employer]e password. You can change your password at any time. 7. Enter your Password Reset Question and Answer. This can be used at a later time if you forget your password. 8. Enter your e-mail address. You will receive e-mail notification when new information is available in [address] 9. Click Sign Up. You can now view and download portions of your medical record. 10. Click the Download Summary menu link to download a portable copy of your medical information. If you have questions, please visit the Frequently Asked Questions section of the iSpye website. Remember, iS[employer]e is NOT to be used for urgent needs. For medical emergencies, dial 911. Now available from your iPhone and Android! Please provide this summary of care documentation to your next provider. Your primary care clinician is listed as Jennifer Charles. If you have any questions after today's visit, please call [phone].

## 2018-01-26 NOTE — PROGRESS NOTES
Subjective:   Patient is a 81y.o. year old male who presents for Hypertension; Diabetes; and Vitamin D Deficiency  1. Depression: He went on a week cruise but was lonely doing that alone. Has gone on some other trips and misses his wife when he does that. But otherwise doing better. Has lots of family around which helps. 2.  DM:  On Januvia 100 mg daily. Last A1c was 5.9. Doesn't check sugars at home. Due for A1c today. 3.  HTN:  On Hyzaar and amlodipine. Well controlled. 4.  Feet:  Goes to a nail place to get his nails cut routinely. Recently he went and there is a little pain since then and he wants that checked out. He doesn't get much burning in the feet but gets tingling in the toes but thinks some of that is from the radiation he had for colon CA. 5.  H/O colon CA:  Due for next colonoscopy in 3 years. Review of Systems   Constitutional: Negative. Endo/Heme/Allergies: Negative. Current Outpatient Prescriptions on File Prior to Visit   Medication Sig Dispense Refill    amLODIPine (NORVASC) 10 mg tablet Take 1 Tab by mouth daily. 90 Tab 1    losartan-hydroCHLOROthiazide (HYZAAR) 100-12.5 mg per tablet Take 1 Tab by mouth daily. 90 Tab 1    sertraline (ZOLOFT) 50 mg tablet Take 1 Tab by mouth daily. 90 Tab 1    allopurinol (ZYLOPRIM) 300 mg tablet Take 300 mg by mouth daily. Indications: 1/2 tab three times a week      SITagliptin (JANUVIA) 100 mg tablet Take 100 mg by mouth daily. Indications: 1/2 pill every other day      aspirin delayed-release 81 mg tablet Take  by mouth daily.  cholecalciferol (VITAMIN D3) 1,000 unit tablet Take  by mouth daily.  ANDROGEL 20.25 mg/1.25 gram (1.62 %) gel APPLY 2 PUMPS TO TRANSDERMAL ROUTE DAILY 1 Bottle 5     No current facility-administered medications on file prior to visit. Reviewed PmHx, RxHx, FmHx, SocHx, AllgHx and updated and dated in the chart.     Nurse notes were reviewed and are correct    Objective: Vitals:    01/26/18 0925   BP: 109/74   Pulse: 76   Resp: 18   Temp: 98 °F (36.7 °C)   TempSrc: Oral   SpO2: 98%   Weight: 197 lb (89.4 kg)   Height: 5' 9\" (1.753 m)     Physical Exam   Constitutional: He is oriented to person, place, and time. He appears well-developed and well-nourished. No distress. HENT:   Head: Normocephalic and atraumatic. Right Ear: External ear normal.   Left Ear: External ear normal.   Nose: Nose normal.   Mouth/Throat: Oropharynx is clear and moist.   Eyes: EOM are normal. Pupils are equal, round, and reactive to light. Neck: Normal range of motion. Neck supple. Carotid bruit is not present. No tracheal deviation present. Cardiovascular: Normal rate, regular rhythm, normal heart sounds and intact distal pulses. Exam reveals no gallop and no friction rub. No murmur heard. Pulmonary/Chest: Effort normal and breath sounds normal. He has no wheezes. He has no rales. Abdominal: Soft. Bowel sounds are normal. He exhibits no distension. There is no tenderness. Musculoskeletal: He exhibits no edema or tenderness. Lymphadenopathy:     He has no cervical adenopathy. Neurological: He is alert and oriented to person, place, and time. Skin: Skin is warm and dry. Psychiatric: He has a normal mood and affect. His behavior is normal.   Nursing note and vitals reviewed. Diabetic foot exam:     Left: Reflexes 2+     Filament test 6/6   Pulse DP: 2+ (normal)   Pulse PT: 2+ (normal)   Deformities: None    Right: Reflexes 2+   Filament test 6/6   Pulse DP: 2+ (normal)   Pulse PT: 2+ (normal)   Deformities: None    Urine microalb/creat:  200    Assessment/ Plan:     Diagnoses and all orders for this visit:    1. Controlled type 2 diabetes mellitus with diabetic polyneuropathy, without long-term current use of insulin ([facility])  [facility] DIABETES FOOT EXAM  -     METABOLIC PANEL, COMPREHENSIVE; Future  -     HEMOGLOBIN A1C WITH EAG;  Future  -     AMB POC URINE, MICROALBUMIN, SEMIQUANTITATIVE    2. Essential hypertension  -     CBC WITH AUTOMATED DIFF; Future    3. Need for hepatitis C screening test  -     HEPATITIS C AB; Future    4. Testicular dysfunction:  Stable on Androgel    I have discussed the diagnosis with the patient and the intended plan as seen in the above orders. The patient verbalized understanding and agrees with the plan. Follow-up Disposition:  Return in about 6 months (around 7/26/2018) for combo clinic.     Halley Alejandra MD

## 2018-01-27 LAB
ALBUMIN SERPL-MCNC: 4.4 G/DL (ref 3.5–4.8)
ALBUMIN/GLOB SERPL: 1.6 {RATIO} (ref 1.2–2.2)
ALP SERPL-CCNC: 55 IU/L (ref 39–117)
ALT SERPL-CCNC: 38 IU/L (ref 0–44)
AST SERPL-CCNC: 36 IU/L (ref 0–40)
BASOPHILS # BLD AUTO: 0 X10E3/UL (ref 0–0.2)
BASOPHILS NFR BLD AUTO: 0 %
BILIRUB SERPL-MCNC: 0.9 MG/DL (ref 0–1.2)
BUN SERPL-MCNC: 25 MG/DL (ref 8–27)
BUN/CREAT SERPL: 16 (ref 10–24)
CALCIUM SERPL-MCNC: 9.3 MG/DL (ref 8.6–10.2)
CHLORIDE SERPL-SCNC: 95 MMOL/L (ref 96–106)
CO2 SERPL-SCNC: 29 MMOL/L (ref 18–29)
CREAT SERPL-MCNC: 1.6 MG/DL (ref 0.76–1.27)
EOSINOPHIL # BLD AUTO: 0.3 X10E3/UL (ref 0–0.4)
EOSINOPHIL NFR BLD AUTO: 4 %
ERYTHROCYTE [DISTWIDTH] IN BLOOD BY AUTOMATED COUNT: 14.6 % (ref 12.3–15.4)
EST. AVERAGE GLUCOSE BLD GHB EST-MCNC: 128 MG/DL
GFR SERPLBLD CREATININE-BSD FMLA CKD-EPI: 43 ML/MIN/1.73
GFR SERPLBLD CREATININE-BSD FMLA CKD-EPI: 50 ML/MIN/1.73
GLOBULIN SER CALC-MCNC: 2.7 G/DL (ref 1.5–4.5)
GLUCOSE SERPL-MCNC: 135 MG/DL (ref 65–99)
HBA1C MFR BLD: 6.1 % (ref 4.8–5.6)
HCT VFR BLD AUTO: 49 % (ref 37.5–51)
HCV AB S/CO SERPL IA: <0.1 S/CO RATIO (ref 0–0.9)
HGB BLD-MCNC: 17.5 G/DL (ref 13–17.7)
IMM GRANULOCYTES # BLD: 0 X10E3/UL (ref 0–0.1)
IMM GRANULOCYTES NFR BLD: 0 %
LYMPHOCYTES # BLD AUTO: 1.6 X10E3/UL (ref 0.7–3.1)
LYMPHOCYTES NFR BLD AUTO: 21 %
MCH RBC QN AUTO: 31 PG (ref 26.6–33)
MCHC RBC AUTO-ENTMCNC: 35.7 G/DL (ref 31.5–35.7)
MCV RBC AUTO: 87 FL (ref 79–97)
MONOCYTES # BLD AUTO: 0.7 X10E3/UL (ref 0.1–0.9)
MONOCYTES NFR BLD AUTO: 9 %
NEUTROPHILS # BLD AUTO: 4.9 X10E3/UL (ref 1.4–7)
NEUTROPHILS NFR BLD AUTO: 66 %
PLATELET # BLD AUTO: 219 X10E3/UL (ref 150–379)
POTASSIUM SERPL-SCNC: 3.9 MMOL/L (ref 3.5–5.2)
PROT SERPL-MCNC: 7.1 G/DL (ref 6–8.5)
RBC # BLD AUTO: 5.65 X10E6/UL (ref 4.14–5.8)
SODIUM SERPL-SCNC: 139 MMOL/L (ref 134–144)
WBC # BLD AUTO: 7.5 X10E3/UL (ref 3.4–10.8)

## 2018-04-03 ENCOUNTER — OFFICE VISIT (OUTPATIENT)
Dept: INTERNAL MEDICINE CLINIC | Age: 73
End: 2018-04-03

## 2018-04-03 VITALS
BODY MASS INDEX: 29.18 KG/M2 | HEART RATE: 88 BPM | RESPIRATION RATE: 18 BRPM | HEIGHT: 69 IN | OXYGEN SATURATION: 97 % | TEMPERATURE: 99.7 F | SYSTOLIC BLOOD PRESSURE: 116 MMHG | DIASTOLIC BLOOD PRESSURE: 75 MMHG | WEIGHT: 197 LBS

## 2018-04-03 DIAGNOSIS — J30.9 ALLERGIC RHINITIS, UNSPECIFIED SEASONALITY, UNSPECIFIED TRIGGER: Primary | ICD-10-CM

## 2018-04-03 DIAGNOSIS — J02.9 SORE THROAT: ICD-10-CM

## 2018-04-03 LAB
S PYO AG THROAT QL: NEGATIVE
VALID INTERNAL CONTROL?: YES

## 2018-04-03 RX ORDER — LEVOCETIRIZINE DIHYDROCHLORIDE 5 MG/1
5 TABLET, FILM COATED ORAL DAILY
Qty: 90 TAB | Refills: 1 | Status: SHIPPED | OUTPATIENT
Start: 2018-04-03 | End: 2018-11-09 | Stop reason: SDUPTHER

## 2018-04-03 NOTE — MR AVS SNAPSHOT
[address] [facility], [place] 
 
 
 [address] [facility] [address] 
[phone] Patient: Karo Daniel MRN: [id number] PCA:[id number][birth year] Visit Information Date & Time Provider Department Dept. Phone Encounter #  
 4/3/2018  3:45 PM Kale Bui PA-C [facility] [phone] [id number] Follow-up Instructions Return if symptoms worsen or fail to improve. Your Appointments 7/27/2018  8:45 AM  
Office Visit with Darcie Fleming MD  
[facility] Appt Note: combo clinic  
 [address] [facility] [address]  
[phone]  
  
   
 [facility] [address] [address] [place] [id number] Upcoming Health Maintenance Date Due FOBT Q 1 YEAR AGE 50-75 8/9/1997 Pneumococcal 65+ Low/Medium Risk (2 of 2 - PPSV23) 4/21/2018 HEMOGLOBIN A1C Q6M 7/26/2018 LIPID PANEL Q1 7/26/2018 FOOT EXAM Q1 1/26/2019 MICROALBUMIN Q1 1/26/2019 EYE EXAM RETINAL OR DILATED Q1 1/26/2019 GLAUCOMA SCREENING Q2Y 1/26/2020 DTaP/Tdap/Td series (2 - Td) 4/21/2027 Allergies as of 4/3/2018  Review Complete On: 4/3/2018 By: Kale Bui PA-C No Known Allergies Current Immunizations  Never Reviewed Name Date Influenza High Dose Vaccine PF 9/26/2017 Not reviewed this visit You Were Diagnosed With   
  
 Codes Comments Allergic rhinitis, unspecified seasonality, unspecified trigger    -  Primary ICD-10-CM: J30.9 ICD-9-CM: 477.9 Sore throat     ICD-10-CM: J02.9 ICD-9-CM: 227 Vitals BP Pulse Temp Resp Height(growth percentile) Weight(growth percentile) 116/75 (BP 1 Location: Left arm, BP Patient Position: Sitting) 88 99.7 °F (37.6 °C) (Oral) 18 5' 9\" (1.753 m) 197 lb (89.4 kg) SpO2 BMI Smoking Status 97% 29.09 kg/m2 Never Smoker BMI and BSA Data  Body Mass Index Body Surface Area  
 29.09 kg/m 2 2.09 m 2  
  
 Preferred Pharmacy Pharmacy Name Phone [facility] [address] [facility] [phone] Your Updated Medication List  
  
   
This list is accurate as of 4/3/18  6:03 PM.  Always use your most recent med list.  
  
  
  
  
 allopurinol 300 mg tablet Commonly known as:  Sen Clemons Take 300 mg by mouth daily. Indications: 1/2 tab three times a week  
  
 amLODIPine 10 mg tablet Commonly known as:  Job Dub Take 1 Tab by mouth daily. ANDROGEL 20.25 mg/1.25 gram (1.62 %) gel Generic drug:  testosterone APPLY 2 PUMPS TO TRANSDERMAL ROUTE DAILY  
  
 aspirin delayed-release 81 mg tablet Take  by mouth daily. JANUVIA 100 mg tablet Generic drug:  SITagliptin Take 100 mg by mouth daily. Indications: 1/2 pill every other day  
  
 levocetirizine 5 mg tablet Commonly known as:  Charol High Take 1 Tab by mouth daily. losartan-hydroCHLOROthiazide 100-12.5 mg per tablet Commonly known as:  HYZAAR Take 1 Tab by mouth daily. sertraline 50 mg tablet Commonly known as:  ZOLOFT Take 1 Tab by mouth daily. VITAMIN D3 1,000 unit tablet Generic drug:  cholecalciferol Take  by mouth daily. Prescriptions Sent to Pharmacy Refills  
 levocetirizine (XYZAL) 5 mg tablet 1 Sig: Take 1 Tab by mouth daily. Class: Normal  
 Pharmacy: [address] [place] [address], [address] [place] [address] Ph #: [phone] Route: Oral  
  
We Performed the Following AMB POC RAPID STREP A [19648 CPT(R)] Follow-up Instructions Return if symptoms worsen or fail to improve. Patient Instructions Allergies: Care Instructions Your Care Instructions Allergies occur when your body's defense system (immune system) overreacts to certain substances. The immune system treats a harmless substance as if it were a harmful germ or virus.  Many things can cause this overreaction, including pollens, medicine, food, dust, animal dander, and mold. Allergies can be mild or severe. Mild allergies can be managed with home treatment. But medicine may be needed to prevent problems. Managing your allergies is an important part of staying healthy. Your doctor may suggest that you have allergy testing to help find out what is causing your allergies. When you know what things trigger your symptoms, you can avoid them. This can prevent allergy symptoms and other health problems. For severe allergies that cause reactions that affect your whole body (anaphylactic reactions), your doctor may prescribe a shot of epinephrine to carry with you in case you have a severe reaction. Learn how to give yourself the shot and keep it with you at all times. Make sure it is not . Follow-up care is a key part of your treatment and safety. Be sure to make and go to all appointments, and call your doctor if you are having problems. It's also a good idea to know your test results and keep a list of the medicines you take. How can you care for yourself at home? · If you have been told by your doctor that dust or dust mites are causing your allergy, decrease the dust around your bed: 
[facility] AUTHORITY sheets, pillowcases, and other bedding in hot water every week. ¨ Use dust-proof covers for pillows, duvets, and mattresses. Avoid plastic covers because they tear easily and do not \"breathe. \" Wash as instructed on the label. ¨ Do not use any blankets and pillows that you do not need. ¨ Use blankets that you can wash in your washing machine. ¨ Consider removing drapes and carpets, which attract and hold dust, from your bedroom. · If you are allergic to house dust and mites, do not use home humidifiers. Your doctor can suggest ways you can control dust and mites. · Look for signs of cockroaches. Cockroaches cause allergic reactions. Use cockroach baits to get rid of them. Then, clean your home well. Cockroaches like areas where grocery bags, newspapers, empty bottles, or cardboard boxes are stored. Do not keep these inside your home, and keep trash and food containers sealed. Seal off any spots where cockroaches might enter your home. · If you are allergic to mold, get rid of furniture, rugs, and drapes that smell musty. Check for mold in the bathroom. · If you are allergic to outdoor pollen or mold spores, use air-conditioning. Change or clean all filters every month. Keep windows closed. · If you are allergic to pollen, stay inside when pollen counts are high. Use a vacuum  with a HEPA filter or a double-thickness filter at least two times each week. · Stay inside when air pollution is bad. Avoid paint fumes, perfumes, and other strong odors. · Avoid conditions that make your allergies worse. Stay away from smoke. Do not smoke or let anyone else smoke in your house. Do not use fireplaces or wood-burning stoves. · If you are allergic to your pets, change the air filter in your furnace every month. Use high-efficiency filters. · If you are allergic to pet dander, keep pets outside or out of your bedroom. Old carpet and cloth furniture can hold a lot of animal dander. You may need to replace them. When should you call for help? Give an epinephrine shot if: 
? · You think you are having a severe allergic reaction. ? · You have symptoms in more than one body area, such as mild nausea and an itchy mouth. ? After giving an epinephrine shot call 911, even if you feel better. ?Call 911 if: 
? · You have symptoms of a severe allergic reaction. These may include: 
¨ Sudden raised, red areas (hives) all over your body. ¨ Swelling of the throat, mouth, lips, or tongue. ¨ Trouble breathing. ¨ Passing out (losing consciousness). Or you may feel very lightheaded or suddenly feel weak, confused, or restless. ? · You have been given an epinephrine shot, even if you feel better. ?Call your doctor now or seek immediate medical care if: 
? · You have symptoms of an allergic reaction, such as: ¨ A rash or hives (raised, red areas on the skin). ¨ Itching. ¨ Swelling. ¨ Belly pain, nausea, or vomiting. ? Watch closely for changes in your health, and be sure to contact your doctor if: 
? · You do not get better as expected. Where can you learn more? Go to http://yamila-jorge.info/. Enter F527 in the search box to learn more about \"Allergies: Care Instructions. \" Current as of: September 29, 2016 Content Version: 11.4 © 3753-0827 Blink. Care instructions adapted under license by [employer] (which disclaims liability or warranty for this information). If you have questions about a medical condition or this instruction, always ask your healthcare professional. [address] any warranty or liability for your use of this information. Introducing [facility] [place] [facility]! Chrissy Galvan introduces [employer] patient portal. Now you can access parts of your medical record, email your doctor's office, and request medication refills online. 1. In your internet browser, go to [email or web address] "[email or web address]"/Affashion 2. Click on the First Time User? Click Here link in the Sign In box. You will see the New Member Sign Up page. 3. Enter your [employer] Access Code exactly as it appears below. You will not need to use this code after youve completed the sign-up process. If you do not sign up before the expiration date, you must request a new code. · [employer] Access Code: [email or web address] Expires: 4/26/2018 10:53 AM 
 
4. Enter the last four digits of your Social Security Number (xxxx) and Date of Birth (mm/dd/yyyy) as indicated and click Submit. You will be taken to the next sign-up page. 5. Create a [employer] ID. This will be your [employer] login ID and cannot be changed, so think of one that is secure and easy to remember. 6. Create a Deskarma password. You can change your password at any time. 7. Enter your Password Reset Question and Answer. This can be used at a later time if you forget your password. 8. Enter your e-mail address. You will receive e-mail notification when new information is available in [address] 9. Click Sign Up. You can now view and download portions of your medical record. 10. Click the Download Summary menu link to download a portable copy of your medical information. If you have questions, please visit the Frequently Asked Questions section of the [employer] website. Remember, Deskarma is NOT to be used for urgent needs. For medical emergencies, dial 911. Now available from your iPhone and Android! Please provide this summary of care documentation to your next provider. Your primary care clinician is listed as Glenn Reyes. If you have any questions after today's visit, please call [phone].

## 2018-04-03 NOTE — PATIENT INSTRUCTIONS
Allergies: Care Instructions  Your Care Instructions    Allergies occur when your body's defense system (immune system) overreacts to certain substances. The immune system treats a harmless substance as if it were a harmful germ or virus. Many things can cause this overreaction, including pollens, medicine, food, dust, animal dander, and mold. Allergies can be mild or severe. Mild allergies can be managed with home treatment. But medicine may be needed to prevent problems. Managing your allergies is an important part of staying healthy. Your doctor may suggest that you have allergy testing to help find out what is causing your allergies. When you know what things trigger your symptoms, you can avoid them. This can prevent allergy symptoms and other health problems. For severe allergies that cause reactions that affect your whole body (anaphylactic reactions), your doctor may prescribe a shot of epinephrine to carry with you in case you have a severe reaction. Learn how to give yourself the shot and keep it with you at all times. Make sure it is not . Follow-up care is a key part of your treatment and safety. Be sure to make and go to all appointments, and call your doctor if you are having problems. It's also a good idea to know your test results and keep a list of the medicines you take. How can you care for yourself at home? · If you have been told by your doctor that dust or dust mites are causing your allergy, decrease the dust around your bed:  [facility] [place] [facility] AUTHORITY sheets, pillowcases, and other bedding in hot water every week. ¨ Use dust-proof covers for pillows, duvets, and mattresses. Avoid plastic covers because they tear easily and do not \"breathe. \" Wash as instructed on the label. ¨ Do not use any blankets and pillows that you do not need. ¨ Use blankets that you can wash in your washing machine. ¨ Consider removing drapes and carpets, which attract and hold dust, from your bedroom.   · If you are allergic to house dust and mites, do not use home humidifiers. Your doctor can suggest ways you can control dust and mites. · Look for signs of cockroaches. Cockroaches cause allergic reactions. Use cockroach baits to get rid of them. Then, clean your home well. Cockroaches like areas where grocery bags, newspapers, empty bottles, or cardboard boxes are stored. Do not keep these inside your home, and keep trash and food containers sealed. Seal off any spots where cockroaches might enter your home. · If you are allergic to mold, get rid of furniture, rugs, and drapes that smell musty. Check for mold in the bathroom. · If you are allergic to outdoor pollen or mold spores, use air-conditioning. Change or clean all filters every month. Keep windows closed. · If you are allergic to pollen, stay inside when pollen counts are high. Use a vacuum  with a HEPA filter or a double-thickness filter at least two times each week. · Stay inside when air pollution is bad. Avoid paint fumes, perfumes, and other strong odors. · Avoid conditions that make your allergies worse. Stay away from smoke. Do not smoke or let anyone else smoke in your house. Do not use fireplaces or wood-burning stoves. · If you are allergic to your pets, change the air filter in your furnace every month. Use high-efficiency filters. · If you are allergic to pet dander, keep pets outside or out of your bedroom. Old carpet and cloth furniture can hold a lot of animal dander. You may need to replace them. When should you call for help? Give an epinephrine shot if:  ? · You think you are having a severe allergic reaction. ? · You have symptoms in more than one body area, such as mild nausea and an itchy mouth. ? After giving an epinephrine shot call 911, even if you feel better. ?Call 911 if:  ? · You have symptoms of a severe allergic reaction. These may include:  ¨ Sudden raised, red areas (hives) all over your body.   ¨ Swelling of the throat, mouth, lips, or tongue. ¨ Trouble breathing. ¨ Passing out (losing consciousness). Or you may feel very lightheaded or suddenly feel weak, confused, or restless. ? · You have been given an epinephrine shot, even if you feel better. ?Call your doctor now or seek immediate medical care if:  ? · You have symptoms of an allergic reaction, such as:  ¨ A rash or hives (raised, red areas on the skin). ¨ Itching. ¨ Swelling. ¨ Belly pain, nausea, or vomiting. ? Watch closely for changes in your health, and be sure to contact your doctor if:  ? · You do not get better as expected. Where can you learn more? Go to http://yamila-jorge.info/. Enter Z333 in the search box to learn more about \"Allergies: Care Instructions. \"  Current as of: September 29, 2016  Content Version: 11.4  © 2912-2823 [employer]. Care instructions adapted under license by [employer] (which disclaims liability or warranty for this information). If you have questions about a medical condition or this instruction, always ask your healthcare professional. [place] [address] any warranty or liability for your use of this information.

## 2018-04-05 DIAGNOSIS — J06.9 VIRAL UPPER RESPIRATORY TRACT INFECTION: Primary | ICD-10-CM

## 2018-04-05 RX ORDER — CODEINE PHOSPHATE AND GUAIFENESIN 10; 100 MG/5ML; MG/5ML
5-10 SOLUTION ORAL
Qty: 180 ML | Refills: 0 | Status: SHIPPED | OUTPATIENT
Start: 2018-04-05 | End: 2018-08-31 | Stop reason: ALTCHOICE

## 2018-04-05 NOTE — PROGRESS NOTES
Patient called asking for something for cough, to help him sleep, so gave Robitussin [facility]

## 2018-04-09 ENCOUNTER — TELEPHONE (OUTPATIENT)
Dept: INTERNAL MEDICINE CLINIC | Age: 73
End: 2018-04-09

## 2018-04-09 ENCOUNTER — OFFICE VISIT (OUTPATIENT)
Dept: INTERNAL MEDICINE CLINIC | Age: 73
End: 2018-04-09

## 2018-04-09 VITALS
DIASTOLIC BLOOD PRESSURE: 65 MMHG | BODY MASS INDEX: 30.21 KG/M2 | OXYGEN SATURATION: 95 % | SYSTOLIC BLOOD PRESSURE: 97 MMHG | WEIGHT: 204 LBS | HEIGHT: 69 IN | TEMPERATURE: 98.7 F | HEART RATE: 77 BPM | RESPIRATION RATE: 20 BRPM

## 2018-04-09 DIAGNOSIS — Z00.00 MEDICARE ANNUAL WELLNESS VISIT, SUBSEQUENT: ICD-10-CM

## 2018-04-09 DIAGNOSIS — J01.90 ACUTE NON-RECURRENT SINUSITIS, UNSPECIFIED LOCATION: Primary | ICD-10-CM

## 2018-04-09 DIAGNOSIS — H10.32 ACUTE CONJUNCTIVITIS OF LEFT EYE, UNSPECIFIED ACUTE CONJUNCTIVITIS TYPE: ICD-10-CM

## 2018-04-09 RX ORDER — AZITHROMYCIN 250 MG/1
TABLET, FILM COATED ORAL
Qty: 6 TAB | Refills: 0 | Status: SHIPPED | OUTPATIENT
Start: 2018-04-09 | End: 2018-08-31 | Stop reason: ALTCHOICE

## 2018-04-09 RX ORDER — POLYMYXIN B SULFATE AND TRIMETHOPRIM 1; 10000 MG/ML; [USP'U]/ML
1 SOLUTION OPHTHALMIC EVERY 6 HOURS
Qty: 10 ML | Refills: 0 | Status: SHIPPED | OUTPATIENT
Start: 2018-04-09 | End: 2018-08-31 | Stop reason: ALTCHOICE

## 2018-04-09 NOTE — PROGRESS NOTES

## 2018-04-09 NOTE — PROGRESS NOTES
Chief Complaint   Patient presents with    Sinus Infection     cough    Pink Eye     left eye   Arcenio De La Rosa Annual Wellness Visit

## 2018-04-09 NOTE — PROGRESS NOTES
Abuse Screening Questionnaire 4/9/2018   Do you ever feel afraid of your partner? N   Are you in a relationship with someone who physically or mentally threatens you? N   Is it safe for you to go home?  Radha Becerril

## 2018-04-09 NOTE — PROGRESS NOTES
Chief Complaint   Patient presents with    Sinus Infection     cough    Pink Eye     left eye             1. Have you been to the ER, urgent care clinic since your last visit? Hospitalized since your last visit? No    2. Have you seen or consulted any other health care providers outside of the [address] [place] [address] since your last visit? Include any pap smears or colon screening.  No

## 2018-04-09 NOTE — PROGRESS NOTES
Subjective:   Patient is a 81y.o. year old male who presents for Sinus Infection (cough) and Pink Eye (left eye)  2 week h/o coughing with sputum, sinus congestion, ST. Saw Analilia Gonzalez last week who thought it was allergies. Gave Xyzal.  ST a little better. But coughing still so I called him in some Robitussin AC. He's not coughing as much but still coughing up green sputum. Now he's developed left eye redness and drainage. He has h/o allergies in [place] but not last year here. He used to take Claritin D there. He's used Z-pack in the past when he got sinusitis. Review of Systems   Constitutional: Negative. HENT: Positive for congestion. Negative for ear pain. Respiratory: Positive for cough and sputum production. Cardiovascular: Negative. Current Outpatient Prescriptions on File Prior to Visit   Medication Sig Dispense Refill    guaiFENesin-codeine (ROBITUSSIN AC) 100-10 mg/5 mL solution Take 5-10 mL by mouth three (3) times daily as needed for Cough. Max Daily Amount: 30 mL. 180 mL 0    levocetirizine (XYZAL) 5 mg tablet Take 1 Tab by mouth daily. 90 Tab 1    ANDROGEL 20.25 mg/1.25 gram (1.62 %) gel APPLY 2 PUMPS TO TRANSDERMAL ROUTE DAILY 1 Bottle 5    amLODIPine (NORVASC) 10 mg tablet Take 1 Tab by mouth daily. 90 Tab 1    losartan-hydroCHLOROthiazide (HYZAAR) 100-12.5 mg per tablet Take 1 Tab by mouth daily. 90 Tab 1    sertraline (ZOLOFT) 50 mg tablet Take 1 Tab by mouth daily. 90 Tab 1    allopurinol (ZYLOPRIM) 300 mg tablet Take 300 mg by mouth daily. Indications: 1/2 tab three times a week      SITagliptin (JANUVIA) 100 mg tablet Take 100 mg by mouth daily. Indications: 1/2 pill every other day      aspirin delayed-release 81 mg tablet Take  by mouth daily.  cholecalciferol (VITAMIN D3) 1,000 unit tablet Take  by mouth daily. No current facility-administered medications on file prior to visit.         Reviewed PmHx, RxHx, FmHx, SocHx, AllgHx and updated and dated in the chart. Nurse notes were reviewed and are correct    Objective:     Vitals:    04/09/18 1109   BP: 97/65   Pulse: 77   Resp: 20   Temp: 98.7 °F (37.1 °C)   TempSrc: Oral   SpO2: 95%   Weight: 204 lb (92.5 kg)   Height: 5' 9\" (1.753 m)     Physical Exam   Constitutional: He appears well-developed and well-nourished. No distress. HENT:   Head: Normocephalic and atraumatic. Right Ear: External ear normal.   Left Ear: External ear normal.   Nose: Nose normal.   Mouth/Throat: Oropharynx is clear and moist. No oropharyngeal exudate. Nose congested  A lot of post-nasal drainage  Sinuses tender   Eyes: EOM are normal. Pupils are equal, round, and reactive to light. Left eye exhibits discharge. Left eye:  Has dried drainage and conjunctiva erythematous throughout   Neck: Normal range of motion. Neck supple. Cardiovascular: Normal rate, regular rhythm and normal heart sounds. Exam reveals no gallop and no friction rub. No murmur heard. Pulmonary/Chest: Effort normal and breath sounds normal. No respiratory distress. He has no wheezes. Lymphadenopathy:     He has no cervical adenopathy. Nursing note and vitals reviewed. Assessment/ Plan:     Diagnoses and all orders for this visit:    1. Acute non-recurrent sinusitis, unspecified location:  He says Z-pack has always worked well so ordered that. Continue Xyzal for the allergy side. Recommended if that's not helping, then can change to Claritin D or Flonase.  -     azithromycin (ZITHROMAX Z-ESTER) 250 mg tablet; Take two tablets today then one tablet daily    2. Acute conjunctivitis of left eye, unspecified acute conjunctivitis type  -     trimethoprim-polymyxin b (POLYTRIM) ophthalmic solution; Administer 1 Drop to left eye every six (6) hours. Indications: BACTERIAL CONJUNCTIVITIS     I have discussed the diagnosis with the patient and the intended plan as seen in the above orders.   The patient verbalized understanding and agrees with the plan. Follow-up Disposition:  Return if symptoms worsen or fail to improve.     Neeta Mcdonald MD

## 2018-04-09 NOTE — PATIENT INSTRUCTIONS
Sinusitis: Care Instructions  Your Care Instructions    Sinusitis is an infection of the lining of the sinus cavities in your head. Sinusitis often follows a cold. It causes pain and pressure in your head and face. In most cases, sinusitis gets better on its own in 1 to 2 weeks. But some mild symptoms may last for several weeks. Sometimes antibiotics are needed. Follow-up care is a key part of your treatment and safety. Be sure to make and go to all appointments, and call your doctor if you are having problems. It's also a good idea to know your test results and keep a list of the medicines you take. How can you care for yourself at home? · Take an over-the-counter pain medicine, such as acetaminophen (Tylenol), ibuprofen (Advil, Motrin), or naproxen (Aleve). Read and follow all instructions on the label. · If the doctor prescribed antibiotics, take them as directed. Do not stop taking them just because you feel better. You need to take the full course of antibiotics. · Be careful when taking over-the-counter cold or flu medicines and Tylenol at the same time. Many of these medicines have acetaminophen, which is Tylenol. Read the labels to make sure that you are not taking more than the recommended dose. Too much acetaminophen (Tylenol) can be harmful. · Breathe warm, moist air from a steamy shower, a hot bath, or a sink filled with hot water. Avoid cold, dry air. Using a humidifier in your home may help. Follow the directions for cleaning the machine. · Use saline (saltwater) nasal washes to help keep your nasal passages open and wash out mucus and bacteria. You can buy saline nose drops at a grocery store or drugstore. Or you can make your own at home by adding 1 teaspoon of salt and 1 teaspoon of baking soda to 2 cups of distilled water. If you make your own, fill a bulb syringe with the solution, insert the tip into your nostril, and squeeze gently. Linette Cramp your nose.   · Put a hot, wet towel or a warm gel pack on your face 3 or 4 times a day for 5 to 10 minutes each time. · Try a decongestant nasal spray like oxymetazoline (Afrin). Do not use it for more than 3 days in a row. Using it for more than 3 days can make your congestion worse. When should you call for help? Call your doctor now or seek immediate medical care if:  ? · You have new or worse swelling or redness in your face or around your eyes. ? · You have a new or higher fever. ? Watch closely for changes in your health, and be sure to contact your doctor if:  ? · You have new or worse facial pain. ? · The mucus from your nose becomes thicker (like pus) or has new blood in it. ? · You are not getting better as expected. Where can you learn more? Go to http://yamila-jorge.info/. Enter J768 in the search box to learn more about \"Sinusitis: Care Instructions. \"  Current as of: May 12, 2017  Content Version: 11.4  © 2028-4033 [employer]. Care instructions adapted under license by [employer] (which disclaims liability or warranty for this information). If you have questions about a medical condition or this instruction, always ask your healthcare professional. [address] any warranty or liability for your use of this information.

## 2018-04-09 NOTE — MR AVS SNAPSHOT
Sheree De Leon 
 
 
 [facility] [address] [facility] [id number] [phone] Patient: Noreen Alcaraz MRN: [id number] PJM:[id number]/[id number] Visit Information Date & Time Provider Department Dept. Phone Encounter #  
 4/9/2018 11:00 AM Olivia Mandujano MD [facility] [phone] Follow-up Instructions Return if symptoms worsen or fail to improve. Your Appointments 7/27/2018  8:45 AM  
Office Visit with Olivia Mandujano MD  
[facility] Appt Note: combo clinic  
 [facility] [address] [facility] [id number] [phone]  
  
   
 [facility] [address] [facility] [address] Upcoming Health Maintenance Date Due FOBT Q 1 YEAR AGE 50-75 8/9/1997 Pneumococcal 65+ Low/Medium Risk (2 of 2 - PPSV23) 4/21/2018 HEMOGLOBIN A1C Q6M 7/26/2018 LIPID PANEL Q1 7/26/2018 FOOT EXAM Q1 1/26/2019 MICROALBUMIN Q1 1/26/2019 EYE EXAM RETINAL OR DILATED Q1 1/26/2019 GLAUCOMA SCREENING Q2Y 1/26/2020 DTaP/Tdap/Td series (2 - Td) 4/21/2027 Allergies as of 4/9/2018  Review Complete On: 4/9/2018 By: Olivia Mandujano MD  
 No Known Allergies Current Immunizations  Never Reviewed Name Date Influenza High Dose Vaccine PF 9/26/2017 Not reviewed this visit You Were Diagnosed With   
  
 Codes Comments Acute non-recurrent sinusitis, unspecified location    -  Primary ICD-10-CM: J01.90 ICD-9-CM: 461.9 Acute conjunctivitis of left eye, unspecified acute conjunctivitis type     ICD-10-CM: H10.32 
ICD-9-CM: 372.00 Vitals BP Pulse Temp Resp Height(growth percentile) Weight(growth percentile) 97/65 (BP 1 Location: Left arm, BP Patient Position: Sitting) 77 98.7 °F (37.1 °C) (Oral) 20 5' 9\" (1.753 m) 204 lb (92.5 kg) SpO2 BMI Smoking Status 95% 30.13 kg/m2 Never Smoker BMI and BSA Data Body Mass Index Body Surface Area  
 30.13 kg/m 2 2.12 m 2 Preferred Pharmacy Pharmacy Name Phone [facility]Y [address] [facility] [phone] Your Updated Medication List  
  
   
This list is accurate as of 4/9/18 11:37 AM.  Always use your most recent med list.  
  
  
  
  
 allopurinol 300 mg tablet Commonly known as:  Roopa Ward Take 300 mg by mouth daily. Indications: 1/2 tab three times a week  
  
 amLODIPine 10 mg tablet Commonly known as:  Sundra Lake[place] Take 1 Tab by mouth daily. ANDROGEL 20.25 mg/1.25 gram (1.62 %) gel Generic drug:  testosterone APPLY 2 PUMPS TO TRANSDERMAL ROUTE DAILY  
  
 aspirin delayed-release 81 mg tablet Take  by mouth daily. azithromycin 250 mg tablet Commonly known as:  Wynonia Asuncion Take two tablets today then one tablet daily  
  
 guaiFENesin-codeine 100-10 mg/5 mL solution Commonly known as:  ROBITUSSIN AC Take 5-10 mL by mouth three (3) times daily as needed for Cough. Max Daily Amount: 30 mL. JANUVIA 100 mg tablet Generic drug:  SITagliptin Take 100 mg by mouth daily. Indications: 1/2 pill every other day  
  
 levocetirizine 5 mg tablet Commonly known as:  Fredrica Wesley Take 1 Tab by mouth daily. losartan-hydroCHLOROthiazide 100-12.5 mg per tablet Commonly known as:  HYZAAR Take 1 Tab by mouth daily. sertraline 50 mg tablet Commonly known as:  ZOLOFT Take 1 Tab by mouth daily. trimethoprim-polymyxin b ophthalmic solution Commonly known as:  POLYTRIM Administer 1 Drop to left eye every six (6) hours. Indications: BACTERIAL CONJUNCTIVITIS  
  
 VITAMIN D3 1,000 unit tablet Generic drug:  cholecalciferol Take  by mouth daily. Prescriptions Sent to Pharmacy Refills  
 azithromycin (ZITHROMAX Z-ESTER) 250 mg tablet 0 Sig: Take two tablets today then one tablet daily  Class: Normal  
 Pharmacy: [id number] Ph #: [phone]  
 trimethoprim-polymyxin b (POLYTRIM) ophthalmic solution 0 Sig: Administer 1 Drop to left eye every six (6) hours. Indications: BACTERIAL CONJUNCTIVITIS Class: Normal  
 Pharmacy: [id number], [address] Ph #: [phone] Route: Left Eye Follow-up Instructions Return if symptoms worsen or fail to improve. Patient Instructions Sinusitis: Care Instructions Your Care Instructions Sinusitis is an infection of the lining of the sinus cavities in your head. Sinusitis often follows a cold. It causes pain and pressure in your head and face. In most cases, sinusitis gets better on its own in 1 to 2 weeks. But some mild symptoms may last for several weeks. Sometimes antibiotics are needed. Follow-up care is a key part of your treatment and safety. Be sure to make and go to all appointments, and call your doctor if you are having problems. It's also a good idea to know your test results and keep a list of the medicines you take. How can you care for yourself at home? · Take an over-the-counter pain medicine, such as acetaminophen (Tylenol), ibuprofen (Advil, Motrin), or naproxen (Aleve). Read and follow all instructions on the label. · If the doctor prescribed antibiotics, take them as directed. Do not stop taking them just because you feel better. You need to take the full course of antibiotics. · Be careful when taking over-the-counter cold or flu medicines and Tylenol at the same time. Many of these medicines have acetaminophen, which is Tylenol. Read the labels to make sure that you are not taking more than the recommended dose. Too much acetaminophen (Tylenol) can be harmful. · Breathe warm, moist air from a steamy shower, a hot bath, or a sink filled with hot water. Avoid cold, dry air.  Using a humidifier in your home may help. Follow the directions for cleaning the machine. · Use saline (saltwater) nasal washes to help keep your nasal passages open and wash out mucus and bacteria. You can buy saline nose drops at a grocery store or drugstore. Or you can make your own at home by adding 1 teaspoon of salt and 1 teaspoon of baking soda to 2 cups of distilled water. If you make your own, fill a bulb syringe with the solution, insert the tip into your nostril, and squeeze gently. Alis Stager your nose. · Put a hot, wet towel or a warm gel pack on your face 3 or 4 times a day for 5 to 10 minutes each time. · Try a decongestant nasal spray like oxymetazoline (Afrin). Do not use it for more than 3 days in a row. Using it for more than 3 days can make your congestion worse. When should you call for help? Call your doctor now or seek immediate medical care if: 
? · You have new or worse swelling or redness in your face or around your eyes. ? · You have a new or higher fever. ? Watch closely for changes in your health, and be sure to contact your doctor if: 
? · You have new or worse facial pain. ? · The mucus from your nose becomes thicker (like pus) or has new blood in it. ? · You are not getting better as expected. Where can you learn more? Go to http://yamila-jorge.info/. Enter W576 in the search box to learn more about \"Sinusitis: Care Instructions. \" Current as of: May 12, 2017 Content Version: 11.4 © 8538-4708 [employer]. Care instructions adapted under license by [employer] (which disclaims liability or warranty for this information). If you have questions about a medical condition or this instruction, always ask your healthcare professional. [place] [address] any warranty or liability for your use of this information. Introducing [facility]!    
 [address] [facility] introduces [employer] patient portal. Now you can access parts of your medical record, email your doctor's office, and request medication refills online. 1. In your internet browser, go to [email or web address] 2. Click on the First Time User? Click Here link in the Sign In box. You will see the New Member Sign Up page. 3. Enter your [employer] Access Code exactly as it appears below. You will not need to use this code after youve completed the sign-up process. If you do not sign up before the expiration date, you must request a new code. · [employer] Access Code: [email or web address] Expires: 4/26/2018 10:53 AM 
 
4. Enter the last four digits of your Social Security Number (xxxx) and Date of Birth (mm/dd/yyyy) as indicated and click Submit. You will be taken to the next sign-up page. 5. Create a [employer] ID. This will be your [employer] login ID and cannot be changed, so think of one that is secure and easy to remember. 6. Create a [employer] password. You can change your password at any time. 7. Enter your Password Reset Question and Answer. This can be used at a later time if you forget your password. 8. Enter your e-mail address. You will receive e-mail notification when new information is available in [address] 9. Click Sign Up. You can now view and download portions of your medical record. 10. Click the Download Summary menu link to download a portable copy of your medical information. If you have questions, please visit the Frequently Asked Questions section of the [employer] website. Remember, [employer] is NOT to be used for urgent needs. For medical emergencies, dial 911. Now available from your iPhone and Android! Please provide this summary of care documentation to your next provider. Your primary care clinician is listed as Teresa Cunningham. If you have any questions after today's visit, please call [phone].

## 2018-04-10 NOTE — TELEPHONE ENCOUNTER
He states that he is very confident that he does nor need to see either of the suggested referrals that you are asking about. He states that he knows he has another couple of years before his next colonoscopy and will let you know he wishes for a referral to be placed.

## 2018-04-10 NOTE — TELEPHONE ENCOUNTER
I reviewed Mr. Ocasio Mode old records looking for when he needs his next colonoscopy. He has h/o rectal CA and was being followed by GI and oncology for that before moving here. Please find out if he has a local GI doctor or oncologist.  If not, find out if he was supposed to get one, and if so, I can make those referrals.

## 2018-04-10 NOTE — PROGRESS NOTES
Medicare Wellness Visit  Hill Burnette is a 81 y.o. male and presents for annual Medicare Wellness Visit. Problem List: Reviewed with patient and discussed risk factors. 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    Testicular dysfunction E29.9    Type 2 diabetes mellitus with nephropathy (HCC) E11.21       Current medical providers:  Patient Care Team:  Hua Brown MD as PCP - General (Family Practice)    PSH: Reviewed with patient  Past Surgical History:   Procedure Laterality Date    ABDOMEN SURGERY PROC UNLISTED      HX COLONOSCOPY          SH: Reviewed with patient  Social History   Substance Use Topics    Smoking status: Never Smoker    Smokeless tobacco: Never Used    Alcohol use No       FH: Reviewed with patient  Family History   Problem Relation Age of Onset    Cancer Mother     No Known Problems Father        Medications/Allergies: Reviewed with patient  Current Outpatient Prescriptions on File Prior to Visit   Medication Sig Dispense Refill    guaiFENesin-codeine (ROBITUSSIN AC) 100-10 mg/5 mL solution Take 5-10 mL by mouth three (3) times daily as needed for Cough. Max Daily Amount: 30 mL. 180 mL 0    levocetirizine (XYZAL) 5 mg tablet Take 1 Tab by mouth daily. 90 Tab 1    ANDROGEL 20.25 mg/1.25 gram (1.62 %) gel APPLY 2 PUMPS TO TRANSDERMAL ROUTE DAILY 1 Bottle 5    amLODIPine (NORVASC) 10 mg tablet Take 1 Tab by mouth daily.  90 Tab 1    losartan-hydroCHLOROthiazide (HYZAAR) 100-12.5 mg per tablet Take 1 Tab by mouth daily. 90 Tab 1    sertraline (ZOLOFT) 50 mg tablet Take 1 Tab by mouth daily. 90 Tab 1    allopurinol (ZYLOPRIM) 300 mg tablet Take 300 mg by mouth daily. Indications: 1/2 tab three times a week      SITagliptin (JANUVIA) 100 mg tablet Take 100 mg by mouth daily. Indications: 1/2 pill every other day      aspirin delayed-release 81 mg tablet Take  by mouth daily.  cholecalciferol (VITAMIN D3) 1,000 unit tablet Take  by mouth daily. No current facility-administered medications on file prior to visit. No Known Allergies    Objective:  Visit Vitals    BP 97/65 (BP 1 Location: Left arm, BP Patient Position: Sitting)    Pulse 77    Temp 98.7 °F (37.1 °C) (Oral)    Resp 20    Ht 5' 9\" (1.753 m)    Wt 204 lb (92.5 kg)    SpO2 95%    BMI 30.13 kg/m2    Body mass index is 30.13 kg/(m^2). Assessment of cognitive impairment: Alert and oriented x 3    Depression Screen:   PHQ over the last two weeks 4/21/2017   Little interest or pleasure in doing things Not at all   Feeling down, depressed or hopeless Not at all   Total Score PHQ 2 0       Fall Risk Assessment:    Fall Risk Assessment, last 12 mths 4/9/2018   Able to walk? No   Fall in past 12 months? -       Functional Ability:   Does the patient exhibit a steady gait? yes   How long did it take the patient to get up and walk from a sitting position? <10 sec   Is the patient self reliant?  (ie can do own laundry, meals, household chores)  yes     Does the patient handle his/her own medications? yes     Does the patient handle his/her own money? yes     Is the patients home safe (ie good lighting, handrails on stairs and bath, etc.)? yes     Did you notice or did patient express any hearing difficulties? no     Did you notice or did patient express any vision difficulties?   no     Were distance and reading eye charts used?   yes       Advance Care Planning:   Patient was offered the opportunity to discuss advance care planning:  yes     Does patient have an Advance Directive:  yes   If no, did you provide information on Caring Connections?  no       Plan:      Orders Placed This Encounter    azithromycin (ZITHROMAX Z-ESTER) 250 mg tablet    trimethoprim-polymyxin b (POLYTRIM) ophthalmic solution   He has h/o rectal cancer, with h/o resection. Was being followed by GI and oncology before moving here. Will find out more about when needs further GI eval/colonoscopy. UTD on vaccines, other HM. Health Maintenance   Topic Date Due    FOBT Q 1 YEAR AGE 50-75  08/09/1997    Pneumococcal 65+ Low/Medium Risk (2 of 2 - PPSV23) 04/21/2018    HEMOGLOBIN A1C Q6M  07/26/2018    LIPID PANEL Q1  07/26/2018    FOOT EXAM Q1  01/26/2019    MICROALBUMIN Q1  01/26/2019    EYE EXAM RETINAL OR DILATED Q1  01/26/2019    GLAUCOMA SCREENING Q2Y  01/26/2020    DTaP/Tdap/Td series (2 - Td) 04/21/2027    Hepatitis C Screening  Completed    ZOSTER VACCINE AGE 60>  Addressed    Influenza Age 5 to Adult  Addressed       *Patient verbalized understanding and agreement with the plan. A copy of the After Visit Summary with personalized health plan was given to the patient today.

## 2018-04-10 NOTE — ACP (ADVANCE CARE PLANNING)
Advance Care Planning (ACP) Provider Conversation Snapshot      Persons included in Conversation:  patient  Length of ACP Conversation in minutes:  <16 minutes (Non-Billable)    Authorized Decision Maker (if patient is incapable of making informed decisions):    This person is:   Patient capable of making healthcare decisions          For Patients with Decision Making Capacity:   Values/Goals: Exploration of values, goals, and preferences if recovery is not expected, even with continued medical treatment in the event of:  Imminent death, severe brain injury    Conversation Outcomes / Follow-Up Plan:   Patient already has ACP and on file

## 2018-04-10 NOTE — TELEPHONE ENCOUNTER
Mr Yi Santamaria called back and said he does not need a colonoscopy until 2021, per his [place].  Nurse note was not in the sytem when I was speaking with with. So I mentioned that Dr Aldean Riedel is probably wanting to do these referrals so he could get established with doctors in the area. With his history if something comes up he will already be established. He agreed with that thought.

## 2018-04-13 NOTE — TELEPHONE ENCOUNTER
Noted.  I was just asking when he needs further eval so I'm happy to wait until he needs the referrals. He can let me know when he needs them.

## 2018-05-03 DIAGNOSIS — I10 ESSENTIAL HYPERTENSION: ICD-10-CM

## 2018-05-03 RX ORDER — AMLODIPINE BESYLATE 10 MG/1
TABLET ORAL
Qty: 90 TAB | Refills: 1 | Status: SHIPPED | OUTPATIENT
Start: 2018-05-03 | End: 2018-10-29 | Stop reason: SDUPTHER

## 2018-05-12 DIAGNOSIS — F32.A DEPRESSION, UNSPECIFIED DEPRESSION TYPE: ICD-10-CM

## 2018-05-14 RX ORDER — SERTRALINE HYDROCHLORIDE 50 MG/1
TABLET, FILM COATED ORAL
Qty: 90 TAB | Refills: 1 | Status: SHIPPED | OUTPATIENT
Start: 2018-05-14 | End: 2019-11-05 | Stop reason: ALTCHOICE

## 2018-05-18 DIAGNOSIS — I10 ESSENTIAL HYPERTENSION: ICD-10-CM

## 2018-05-18 RX ORDER — LOSARTAN POTASSIUM AND HYDROCHLOROTHIAZIDE 12.5; 1 MG/1; MG/1
TABLET ORAL
Qty: 90 TAB | Refills: 1 | Status: SHIPPED | OUTPATIENT
Start: 2018-05-18 | End: 2018-11-09 | Stop reason: SDUPTHER

## 2018-07-25 DIAGNOSIS — J30.9 ALLERGIC RHINITIS, UNSPECIFIED SEASONALITY, UNSPECIFIED TRIGGER: ICD-10-CM

## 2018-07-25 RX ORDER — LEVOCETIRIZINE DIHYDROCHLORIDE 5 MG/1
5 TABLET, FILM COATED ORAL DAILY
Qty: 90 TAB | Refills: 1 | OUTPATIENT
Start: 2018-07-25

## 2018-08-31 ENCOUNTER — OFFICE VISIT (OUTPATIENT)
Dept: INTERNAL MEDICINE CLINIC | Age: 73
End: 2018-08-31

## 2018-08-31 VITALS
OXYGEN SATURATION: 97 % | TEMPERATURE: 98.2 F | RESPIRATION RATE: 18 BRPM | BODY MASS INDEX: 29.62 KG/M2 | DIASTOLIC BLOOD PRESSURE: 79 MMHG | SYSTOLIC BLOOD PRESSURE: 133 MMHG | HEART RATE: 75 BPM | HEIGHT: 69 IN | WEIGHT: 200 LBS

## 2018-08-31 DIAGNOSIS — I10 ESSENTIAL HYPERTENSION: ICD-10-CM

## 2018-08-31 DIAGNOSIS — Z23 ENCOUNTER FOR IMMUNIZATION: ICD-10-CM

## 2018-08-31 DIAGNOSIS — Z12.5 SCREENING FOR PROSTATE CANCER: ICD-10-CM

## 2018-08-31 DIAGNOSIS — E11.42 CONTROLLED TYPE 2 DIABETES MELLITUS WITH DIABETIC POLYNEUROPATHY, WITHOUT LONG-TERM CURRENT USE OF INSULIN (HCC): Primary | ICD-10-CM

## 2018-08-31 NOTE — MR AVS SNAPSHOT
Juliano Rios 
 
 
 [facility] [address] [facility] [address] 
[phone] Patient: Nicki Hernandez MRN: [id number] Visit Information Date & Time Provider Department Dept. Phone Encounter #  
 8/31/2018  9:15 AM Darol Cheadle, MD [facility] [phone] Follow-up Instructions Return in about 6 months (around 2/28/2019) for [facility], [facility]. Upcoming Health Maintenance Date Due FOBT Q 1 YEAR AGE 50-75 8/9/1997 Pneumococcal 65+ Low/Medium Risk (2 of 2 - PPSV23) 4/21/2018 HEMOGLOBIN A1C Q6M 7/26/2018 LIPID PANEL Q1 7/26/2018 Influenza Age 5 to Adult 8/1/2018 FOOT EXAM Q1 1/26/2019 MICROALBUMIN Q1 1/26/2019 EYE EXAM RETINAL OR DILATED Q1 1/26/2019 GLAUCOMA SCREENING Q2Y 1/26/2020 DTaP/Tdap/Td series (2 - Td) 4/21/2027 Allergies as of 8/31/2018  Review Complete On: 8/31/2018 By: Jamarcus Patel No Known Allergies Current Immunizations  Never Reviewed Name Date Influenza High Dose Vaccine PF  Incomplete, 9/26/2017 Not reviewed this visit You Were Diagnosed With   
  
 Codes Comments Controlled type 2 diabetes mellitus with diabetic polyneuropathy, without long-term current use of insulin (HCC)    -  Primary ICD-10-CM: E11.42 
ICD-9-CM: 250.60, 357.2 Essential hypertension     ICD-10-CM: I10 
ICD-9-CM: 401.9 Encounter for immunization     ICD-10-CM: X00 ICD-9-CM: V03.89 Screening for prostate cancer     ICD-10-CM: Z12.5 ICD-9-CM: V76.44 Vitals BP Pulse Temp Resp Height(growth percentile) Weight(growth percentile) 133/79 (BP 1 Location: Left arm, BP Patient Position: Sitting) 75 98.2 °F (36.8 °C) (Oral) 18 5' 9\" (1.753 m) 200 lb (90.7 kg) SpO2 BMI Smoking Status 97% 29.53 kg/m2 Never Smoker BMI and BSA Data Body Mass Index Body Surface Area  
 29.53 kg/m 2 2.1 m 2 Preferred Pharmacy Pharmacy Name Phone [facility] [address] [facility] [address] [address] [facility] [phone] Your Updated Medication List  
  
   
This list is accurate as of 8/31/18  9:39 AM.  Always use your most recent med list.  
  
  
  
  
 allopurinol 300 mg tablet Commonly known as:  Rosenbaum Better Take 300 mg by mouth daily. Indications: 1/2 tab three times a week  
  
 amLODIPine 10 mg tablet Commonly known as:  Ana Rosa Pace TAKE 1 TABLET BY MOUTH DAILY ANDROGEL 20.25 mg/1.25 gram (1.62 %) gel Generic drug:  testosterone APPLY 2 PUMPS TO TRANSDERMAL ROUTE DAILY  
  
 aspirin delayed-release 81 mg tablet Take  by mouth daily. JANUVIA 100 mg tablet Generic drug:  SITagliptin Take 100 mg by mouth daily. Indications: 1/2 pill every other day  
  
 levocetirizine 5 mg tablet Commonly known as:  Stacie Yanick Take 1 Tab by mouth daily. losartan-hydroCHLOROthiazide 100-12.5 mg per tablet Commonly known as:  HYZAAR  
TAKE 1 TABLET BY MOUTH DAILY  
  
 sertraline 50 mg tablet Commonly known as:  ZOLOFT  
*10/22* TAKE 1 TABLET BY MOUTH DAILY  
  
 VITAMIN D3 1,000 unit tablet Generic drug:  cholecalciferol Take  by mouth daily. We Performed the Following ADMIN INFLUENZA VIRUS VAC [ [facility]] INFLUENZA VIRUS VACCINE, HIGH DOSE SEASONAL, PRESERVATIVE FREE [15857 CPT(R)] Follow-up Instructions Return in about 6 months (around 2/28/2019) for combo clinic [facility]. To-Do List   
 08/31/2018 Lab:  CBC WITH AUTOMATED DIFF   
  
 08/31/2018 Lab:  HEMOGLOBIN A1C WITH EAG   
  
 08/31/2018 Lab:  LIPID PANEL   
  
 08/31/2018 Lab:  METABOLIC PANEL, COMPREHENSIVE   
  
 08/31/2018 Lab:  PSA SCREENING (SCREENING) Introducing [facility] [place] [facility]! [employer] introduces [employer] patient portal. Now you can access parts of your medical record, email your doctor's office, and request medication refills online. 1. In your internet browser, go to [email or web address] 2. Click on the First Time User? Click Here link in the Sign In box. You will see the New Member Sign Up page. 3. Enter your [employer] Access Code exactly as it appears below. You will not need to use this code after youve completed the sign-up process. If you do not sign up before the expiration date, you must request a new code. · [employer] Access Code: [email or web address] Expires: 11/29/2018  9:38 AM 
 
4. Enter the last four digits of your Social Security Number (xxxx) and Date of Birth (mm/dd/yyyy) as indicated and click Submit. You will be taken to the next sign-up page. 5. Create a [employer] ID. This will be your [employer] login ID and cannot be changed, so think of one that is secure and easy to remember. 6. Create a [employer] password. You can change your password at any time. 7. Enter your Password Reset Question and Answer. This can be used at a later time if you forget your password. 8. Enter your e-mail address. You will receive e-mail notification when new information is available in [address] 9. Click Sign Up. You can now view and download portions of your medical record. 10. Click the Download Summary menu link to download a portable copy of your medical information. If you have questions, please visit the Frequently Asked Questions section of the [employer] website. Remember, [employer] is NOT to be used for urgent needs. For medical emergencies, dial 911. Now available from your iPhone and Android! Please provide this summary of care documentation to your next provider. Your primary care clinician is listed as Loyd Serrano. If you have any questions after today's visit, please call [phone].

## 2018-08-31 NOTE — PROGRESS NOTES
Chief Complaint Patient presents with  Diabetes  Cholesterol Problem  Hypertension 1. Have you been to the ER, urgent care clinic since your last visit? Hospitalized since your last visit? No 
 
2. Have you seen or consulted any other health care providers outside of the [address] [place] [address] since your last visit? Include any pap smears or colon screening.  No

## 2018-08-31 NOTE — PROGRESS NOTES
Subjective:  
Patient is a 72y.o. year old male who presents for Diabetes; Cholesterol Problem; and Hypertension 1. DM:  He's fasting today. He doesn't check sugars. He put on some weight while out of town but doing better now and getting the weight off. Last A1c was 6.1. He's just on Januvia for DM. Not on metformin because got mouth swelling on that. The Januvia he takes 1/2 every other day. 2.  HTN:  On Hyzaar daily as well as Norvasc. 3.  Gout:  On allopurinol twice weekly. No attacks I na long time 4. Allergies: On Xyzal and sometimes Flonase. HM:  UTD on colonoscopy:  Until 2021. Review of Systems Constitutional: Negative. Cardiovascular: Negative. Endo/Heme/Allergies: Negative. Current Outpatient Prescriptions on File Prior to Visit Medication Sig Dispense Refill  losartan-hydroCHLOROthiazide (HYZAAR) 100-12.5 mg per tablet TAKE 1 TABLET BY MOUTH DAILY 90 Tab 1  
 sertraline (ZOLOFT) 50 mg tablet *10/22* TAKE 1 TABLET BY MOUTH DAILY 90 Tab 1  
 amLODIPine (NORVASC) 10 mg tablet TAKE 1 TABLET BY MOUTH DAILY 90 Tab 1  
 levocetirizine (XYZAL) 5 mg tablet Take 1 Tab by mouth daily. 90 Tab 1  ANDROGEL 20.25 mg/1.25 gram (1.62 %) gel APPLY 2 PUMPS TO TRANSDERMAL ROUTE DAILY 1 Bottle 5  
 allopurinol (ZYLOPRIM) 300 mg tablet Take 300 mg by mouth daily. Indications: 1/2 tab three times a week  SITagliptin (JANUVIA) 100 mg tablet Take 100 mg by mouth daily. Indications: 1/2 pill every other day  aspirin delayed-release 81 mg tablet Take  by mouth daily.  cholecalciferol (VITAMIN D3) 1,000 unit tablet Take  by mouth daily. No current facility-administered medications on file prior to visit. Reviewed PmHx, RxHx, FmHx, SocHx, AllgHx and updated and dated in the chart. Nurse notes were reviewed and are correct Objective:  
 
Vitals:  
 08/31/18 7677 BP: 133/79 Pulse: 75 Resp: 18 Temp: 98.2 °F (36.8 °C) TempSrc: Oral  
 SpO2: 97% Weight: 200 lb (90.7 kg) Height: 5' 9\" (1.753 m) Physical Exam  
Constitutional: He is oriented to person, place, and time. He appears well-developed and well-nourished. No distress. HENT:  
Head: Normocephalic and atraumatic. Cardiovascular: Normal rate, regular rhythm, normal heart sounds and intact distal pulses. Exam reveals no gallop and no friction rub. No murmur heard. Pulmonary/Chest: Effort normal and breath sounds normal. No respiratory distress. Abdominal: Soft. Bowel sounds are normal. He exhibits no distension. There is no tenderness. Musculoskeletal: He exhibits no edema. Lymphadenopathy:  
  He has no cervical adenopathy. Neurological: He is alert and oriented to person, place, and time. Skin: Skin is warm and dry. Psychiatric: He has a normal mood and affect. His behavior is normal.  
Nursing note and vitals reviewed. Assessment/ Plan:  
 
Diagnoses and all orders for this visit: 1. Controlled type 2 diabetes mellitus with diabetic polyneuropathy, without long-term current use of insulin ([facility] [address]): Will work on losing some weight, with diet and exercise. Wants to get back to old weight and then go lower and I encouraged him this will help with his sugars. 
-     LIPID PANEL; Future 
-     HEMOGLOBIN A1C WITH EAG; Future 2. Essential hypertension 
-     CBC WITH AUTOMATED DIFF; Future -     METABOLIC PANEL, COMPREHENSIVE; Future 3. Encounter for immunization 
-     INFLUENZA VIRUS VACCINE, HIGH DOSE SEASONAL, PRESERVATIVE FREE 
-     ADMIN INFLUENZA VIRUS VAC 4. Screening for prostate cancer -     PSA SCREENING (SCREENING); Future I have discussed the diagnosis with the patient and the intended plan as seen in the above orders. The patient verbalized understanding and agrees with the plan. Follow-up Disposition: 
Return in about 6 months (around 2/28/2019) for [facility].  
 
Matty Alicia MD

## 2018-09-01 LAB
ALBUMIN SERPL-MCNC: 4.4 G/DL (ref 3.5–4.8)
ALBUMIN/GLOB SERPL: 1.5 {RATIO} (ref 1.2–2.2)
ALP SERPL-CCNC: 49 IU/L (ref 39–117)
ALT SERPL-CCNC: 33 IU/L (ref 0–44)
AST SERPL-CCNC: 30 IU/L (ref 0–40)
BASOPHILS # BLD AUTO: 0 X10E3/UL (ref 0–0.2)
BASOPHILS NFR BLD AUTO: 0 %
BILIRUB SERPL-MCNC: 0.7 MG/DL (ref 0–1.2)
BUN SERPL-MCNC: 23 MG/DL (ref 8–27)
BUN/CREAT SERPL: 14 (ref 10–24)
CALCIUM SERPL-MCNC: 9.4 MG/DL (ref 8.6–10.2)
CHLORIDE SERPL-SCNC: 98 MMOL/L (ref 96–106)
CHOLEST SERPL-MCNC: 177 MG/DL (ref 100–199)
CO2 SERPL-SCNC: 26 MMOL/L (ref 20–29)
CREAT SERPL-MCNC: 1.66 MG/DL (ref 0.76–1.27)
EOSINOPHIL # BLD AUTO: 0.2 X10E3/UL (ref 0–0.4)
EOSINOPHIL NFR BLD AUTO: 3 %
ERYTHROCYTE [DISTWIDTH] IN BLOOD BY AUTOMATED COUNT: 14.1 % (ref 12.3–15.4)
EST. AVERAGE GLUCOSE BLD GHB EST-MCNC: 146 MG/DL
GLOBULIN SER CALC-MCNC: 2.9 G/DL (ref 1.5–4.5)
GLUCOSE SERPL-MCNC: 134 MG/DL (ref 65–99)
HBA1C MFR BLD: 6.7 % (ref 4.8–5.6)
HCT VFR BLD AUTO: 52.3 % (ref 37.5–51)
HDLC SERPL-MCNC: 45 MG/DL
HGB BLD-MCNC: 17.3 G/DL (ref 13–17.7)
IMM GRANULOCYTES # BLD: 0 X10E3/UL (ref 0–0.1)
IMM GRANULOCYTES NFR BLD: 0 %
LDLC SERPL CALC-MCNC: 103 MG/DL (ref 0–99)
LYMPHOCYTES # BLD AUTO: 1.4 X10E3/UL (ref 0.7–3.1)
LYMPHOCYTES NFR BLD AUTO: 22 %
MCH RBC QN AUTO: 28.4 PG (ref 26.6–33)
MCHC RBC AUTO-ENTMCNC: 33.1 G/DL (ref 31.5–35.7)
MCV RBC AUTO: 86 FL (ref 79–97)
MONOCYTES # BLD AUTO: 0.6 X10E3/UL (ref 0.1–0.9)
MONOCYTES NFR BLD AUTO: 9 %
NEUTROPHILS # BLD AUTO: 4.4 X10E3/UL (ref 1.4–7)
NEUTROPHILS NFR BLD AUTO: 66 %
PLATELET # BLD AUTO: 185 X10E3/UL (ref 150–379)
POTASSIUM SERPL-SCNC: 3.8 MMOL/L (ref 3.5–5.2)
PROT SERPL-MCNC: 7.3 G/DL (ref 6–8.5)
PSA SERPL-MCNC: 1.6 NG/ML (ref 0–4)
RBC # BLD AUTO: 6.1 X10E6/UL (ref 4.14–5.8)
SODIUM SERPL-SCNC: 142 MMOL/L (ref 134–144)
TRIGL SERPL-MCNC: 144 MG/DL (ref 0–149)
VLDLC SERPL CALC-MCNC: 29 MG/DL (ref 5–40)
WBC # BLD AUTO: 6.6 X10E3/UL (ref 3.4–10.8)

## 2018-09-04 NOTE — PROGRESS NOTES
Let him know his blood work was all fine. Kidney function stable, no worse. Cholesterol well controlled. A1c up a little to 6.7.   Work on losing that extra weight and eating well and should come back down to previous level (low 6 range)

## 2018-09-13 DIAGNOSIS — E29.9 TESTICULAR DYSFUNCTION: ICD-10-CM

## 2018-09-18 RX ORDER — TESTOSTERONE 16.2 MG/G
GEL TRANSDERMAL
Qty: 1 BOTTLE | Refills: 5 | Status: SHIPPED | OUTPATIENT
Start: 2018-09-18 | End: 2018-12-18 | Stop reason: SDUPTHER

## 2018-09-18 NOTE — TELEPHONE ENCOUNTER
I just printed this out. Could you call it in since it's been 5 days and I won't be in the office for 2 more days?

## 2018-10-29 DIAGNOSIS — I10 ESSENTIAL HYPERTENSION: ICD-10-CM

## 2018-10-29 RX ORDER — AMLODIPINE BESYLATE 10 MG/1
TABLET ORAL
Qty: 90 TAB | Refills: 1 | Status: SHIPPED | OUTPATIENT
Start: 2018-10-29 | End: 2018-12-18 | Stop reason: SDUPTHER

## 2018-11-09 DIAGNOSIS — I10 ESSENTIAL HYPERTENSION: ICD-10-CM

## 2018-11-09 RX ORDER — LOSARTAN POTASSIUM AND HYDROCHLOROTHIAZIDE 12.5; 1 MG/1; MG/1
TABLET ORAL
Qty: 90 TAB | Refills: 1 | Status: SHIPPED | OUTPATIENT
Start: 2018-11-09 | End: 2018-12-18 | Stop reason: SDUPTHER

## 2018-12-17 ENCOUNTER — PATIENT OUTREACH (OUTPATIENT)
Dept: INTERNAL MEDICINE CLINIC | Age: 73
End: 2018-12-17

## 2018-12-18 ENCOUNTER — TELEPHONE (OUTPATIENT)
Dept: INTERNAL MEDICINE CLINIC | Age: 73
End: 2018-12-18

## 2018-12-18 DIAGNOSIS — E11.42 CONTROLLED TYPE 2 DIABETES MELLITUS WITH DIABETIC POLYNEUROPATHY, WITHOUT LONG-TERM CURRENT USE OF INSULIN (HCC): ICD-10-CM

## 2018-12-18 DIAGNOSIS — I10 ESSENTIAL HYPERTENSION: ICD-10-CM

## 2018-12-18 DIAGNOSIS — M1A.9XX0 CHRONIC GOUT WITHOUT TOPHUS, UNSPECIFIED CAUSE, UNSPECIFIED SITE: ICD-10-CM

## 2018-12-18 DIAGNOSIS — E11.42 CONTROLLED TYPE 2 DIABETES MELLITUS WITH DIABETIC POLYNEUROPATHY, WITHOUT LONG-TERM CURRENT USE OF INSULIN (HCC): Primary | ICD-10-CM

## 2018-12-18 DIAGNOSIS — E29.9 TESTICULAR DYSFUNCTION: ICD-10-CM

## 2018-12-18 RX ORDER — AMLODIPINE BESYLATE 10 MG/1
TABLET ORAL
Qty: 30 TAB | Refills: 0 | Status: SHIPPED | OUTPATIENT
Start: 2018-12-18 | End: 2018-12-18 | Stop reason: SDUPTHER

## 2018-12-18 RX ORDER — TESTOSTERONE 20.25 MG/1.25G
GEL TOPICAL
Qty: 3 BOTTLE | Refills: 1 | Status: SHIPPED | OUTPATIENT
Start: 2018-12-18 | End: 2019-06-05 | Stop reason: SDUPTHER

## 2018-12-18 RX ORDER — LOSARTAN POTASSIUM AND HYDROCHLOROTHIAZIDE 12.5; 1 MG/1; MG/1
TABLET ORAL
Qty: 90 TAB | Refills: 1 | Status: SHIPPED | OUTPATIENT
Start: 2018-12-18 | End: 2019-05-20 | Stop reason: SDUPTHER

## 2018-12-18 RX ORDER — AMLODIPINE BESYLATE 10 MG/1
TABLET ORAL
Qty: 90 TAB | Refills: 1 | Status: SHIPPED | OUTPATIENT
Start: 2018-12-18 | End: 2019-05-20 | Stop reason: SDUPTHER

## 2018-12-18 RX ORDER — ALLOPURINOL 300 MG/1
300 TABLET ORAL DAILY
Qty: 30 TAB | Refills: 0 | Status: SHIPPED | OUTPATIENT
Start: 2018-12-18 | End: 2018-12-18 | Stop reason: SDUPTHER

## 2018-12-18 RX ORDER — ALLOPURINOL 300 MG/1
300 TABLET ORAL DAILY
Qty: 90 TAB | Refills: 1 | Status: SHIPPED | OUTPATIENT
Start: 2018-12-18 | End: 2019-11-05 | Stop reason: SDUPTHER

## 2018-12-18 RX ORDER — TESTOSTERONE 20.25 MG/1.25G
GEL TOPICAL
Qty: 1 BOTTLE | Refills: 0 | Status: SHIPPED | OUTPATIENT
Start: 2018-12-18 | End: 2018-12-18 | Stop reason: SDUPTHER

## 2018-12-18 RX ORDER — LOSARTAN POTASSIUM AND HYDROCHLOROTHIAZIDE 12.5; 1 MG/1; MG/1
TABLET ORAL
Qty: 30 TAB | Refills: 0 | Status: SHIPPED | OUTPATIENT
Start: 2018-12-18 | End: 2018-12-18 | Stop reason: SDUPTHER

## 2019-01-23 ENCOUNTER — DOCUMENTATION ONLY (OUTPATIENT)
Dept: INTERNAL MEDICINE CLINIC | Age: 74
End: 2019-01-23

## 2019-04-29 ENCOUNTER — OFFICE VISIT (OUTPATIENT)
Dept: INTERNAL MEDICINE CLINIC | Age: 74
End: 2019-04-29

## 2019-04-29 VITALS
OXYGEN SATURATION: 98 % | RESPIRATION RATE: 18 BRPM | BODY MASS INDEX: 32.44 KG/M2 | WEIGHT: 219 LBS | HEART RATE: 77 BPM | HEIGHT: 69 IN | TEMPERATURE: 98 F | DIASTOLIC BLOOD PRESSURE: 91 MMHG | SYSTOLIC BLOOD PRESSURE: 165 MMHG

## 2019-04-29 DIAGNOSIS — E11.42 CONTROLLED TYPE 2 DIABETES MELLITUS WITH DIABETIC POLYNEUROPATHY, WITHOUT LONG-TERM CURRENT USE OF INSULIN (HCC): ICD-10-CM

## 2019-04-29 DIAGNOSIS — K42.9 PERIUMBILICAL HERNIA: ICD-10-CM

## 2019-04-29 DIAGNOSIS — M1A.9XX0 CHRONIC GOUT WITHOUT TOPHUS, UNSPECIFIED CAUSE, UNSPECIFIED SITE: ICD-10-CM

## 2019-04-29 DIAGNOSIS — J30.9 ALLERGIC RHINITIS, UNSPECIFIED SEASONALITY, UNSPECIFIED TRIGGER: ICD-10-CM

## 2019-04-29 DIAGNOSIS — Z23 ENCOUNTER FOR IMMUNIZATION: ICD-10-CM

## 2019-04-29 DIAGNOSIS — Z00.00 MEDICARE ANNUAL WELLNESS VISIT, SUBSEQUENT: ICD-10-CM

## 2019-04-29 DIAGNOSIS — I10 ESSENTIAL HYPERTENSION: Primary | ICD-10-CM

## 2019-04-29 DIAGNOSIS — E55.9 VITAMIN D DEFICIENCY: ICD-10-CM

## 2019-04-29 LAB
CREAT SERPL-MCNC: 50 MG/DL
MICROALBUMIN UR TEST STR-MCNC: 30 MG/L
MICROALBUMIN/CREAT RATIO POC: >300 MG/G

## 2019-04-29 NOTE — PROGRESS NOTES
This is the Medicare Annual Wellness Exam 
 
I have reviewed the patient's medical history in detail and updated the computerized patient record. History Past Medical History:  
Diagnosis Date  Cancer ([facility] [address]  Diabetes ([facility] [address]  Hypertension  Reactive depression 5/22/2017 Past Surgical History:  
Procedure Laterality Date [address] UNLISTED  HX COLONOSCOPY Social History Socioeconomic History  Marital status:  Spouse name: Not on file  Number of children: Not on file  Years of education: Not on file  Highest education level: Not on file Occupational History  Not on file Social Needs  Financial resource strain: Not on file  Food insecurity:  
  Worry: Not on file Inability: Not on file  Transportation needs:  
  Medical: Not on file Non-medical: Not on file Tobacco Use  Smoking status: Never Smoker  Smokeless tobacco: Never Used Substance and Sexual Activity  Alcohol use: No  
 Drug use: Not on file  Sexual activity: Never Lifestyle  Physical activity:  
  Days per week: Not on file Minutes per session: Not on file  Stress: Not on file Relationships  Social connections:  
  Talks on phone: Not on file Gets together: Not on file Attends Jainism service: Not on file Active member of club or organization: Not on file Attends meetings of clubs or organizations: Not on file Relationship status: Not on file  Intimate partner violence:  
  Fear of current or ex partner: Not on file Emotionally abused: Not on file Physically abused: Not on file Forced sexual activity: Not on file Other Topics Concern  Not on file Social History Narrative  Not on file Family History Problem Relation Age of Onset  Cancer Mother  No Known Problems Father Current Outpatient Medications on File Prior to Visit Medication Sig Dispense Refill  losartan-hydroCHLOROthiazide (HYZAAR) 100-12.5 mg per tablet TAKE 1 TABLET BY MOUTH DAILY 90 Tab 1  
 amLODIPine (NORVASC) 10 mg tablet TAKE 1 TABLET BY MOUTH DAILY 90 Tab 1  
 testosterone (ANDROGEL) 20.25 mg/1.25 gram (1.62 %) gel APPLY 2 PUMPS DAILY 3 Bottle 1  
 allopurinol (ZYLOPRIM) 300 mg tablet Take 1 Tab by mouth daily. 90 Tab 1  
 SITagliptin (JANUVIA) 100 mg tablet Take 1 Tab by mouth daily. 90 Tab 1  
 levocetirizine (XYZAL) 5 mg tablet TAKE 1 TABLET BY MOUTH ONCE DAILY 90 Tab 1  
 sertraline (ZOLOFT) 50 mg tablet *10/22* TAKE 1 TABLET BY MOUTH DAILY 90 Tab 1  
 aspirin delayed-release 81 mg tablet Take  by mouth daily.  cholecalciferol (VITAMIN D3) 1,000 unit tablet Take  by mouth daily. No current facility-administered medications on file prior to visit. No Known Allergies Patient Active Problem List  
Diagnosis Code  Controlled type 2 diabetes mellitus with diabetic polyneuropathy, without long-term current use of insulin ([facility]) E11.42  
 Essential hypertension I10  
 Idiopathic gout M10.00  CKD (chronic kidney disease) N18.9  Reactive depression F32.9  Primary insomnia F51.01  
 History of rectal cancer Z85.048  
 Controlled type 2 diabetes mellitus without complication, without long-term current use of insulin ([facility]) E11.9  Depression F32.9  Chronic gout without tophus M1A. 9XX0  Testicular dysfunction E29.9  Type 2 diabetes mellitus with nephropathy ([facility]) E11.21 Vital Signs:  
 
Visit Vitals BP (!) 165/91 Pulse 77 Temp 98 °F (36.7 °C) (Oral) Resp 18 Ht 5' 9\" (1.753 m) Wt 219 lb (99.3 kg) SpO2 98% BMI 32.34 kg/m² Body mass index is 32.34 kg/m². Depression Risk Factor Screening:  
  
3 most recent PHQ Screens 4/21/2017 Little interest or pleasure in doing things Not at all Feeling down, depressed, irritable, or hopeless Not at all Total Score PHQ 2 0  
 
 
 Alcohol Risk Factor Screening: You do not drink alcohol or very rarely. Functional Ability and Level of Safety:  
Hearing Loss Hearing is good. Vision Screening Vision is good. No exam data present Activities of Daily Living The home contains: no safety equipment. ADL Assessment 4/29/2019 Feeding yourself No Help Needed Getting from bed to chair No Help Needed Getting dressed No Help Needed Bathing or showering No Help Needed Walk across the room (includes cane/walker) No Help Needed Using the telphone No Help Needed Taking your medications No Help Needed Preparing meals No Help Needed Managing money (expenses/bills) No Help Needed Moderately strenuous housework (laundry) No Help Needed Shopping for personal items (toiletries/medicines) No Help Needed Shopping for groceries No Help Needed Driving No Help Needed Climbing a flight of stairs No Help Needed Getting to places beyond walking distances No Help Needed Fall Risk Fall Risk Assessment, last 12 mths 4/29/2019 Able to walk? Yes Fall in past 12 months? No  
 
 
Abuse Screen Abuse Screening Questionnaire 4/29/2019 Do you ever feel afraid of your partner? Disha Flock Are you in a relationship with someone who physically or mentally threatens you? Disha Flock Is it safe for you to go home? Alejandro Edwards Cognitive Screening Evaluation of Cognitive Function: 
Has your family/caregiver stated any concerns about your memory: no 
Normal 
 
Patient Care Team  
 Patient Care Team: 
Osman Bui MD as PCP - [facility]) End of Life Planning Advanced care planning directives were discussed with the patient and /or family/caregiver. On file Assessment/Plan Diagnoses and all orders for this visit: 
No orders of the defined types were placed in this encounter. 1.  Went to eye doctor recently. 2.  Colonoscopy UTD until 2021. 3.  Had Zostavax a few years ago. Had at least 1 pneumonia vaccine. Not sure which or when. Will get Prevnar this year and maybe Shingrix next year Health Maintenance Topic Date Due  Shingrix Vaccine Age 50> (1 of 2) 08/09/1997  Pneumococcal 65+ years (1 of 2 - PCV13) 08/09/2012  MICROALBUMIN Q1  01/26/2019  
 FOOT EXAM Q1  01/28/2019  Influenza Age 5 to Adult  08/01/2019  LIPID PANEL Q1  08/31/2019  
 HEMOGLOBIN A1C Q6M  10/29/2019  GLAUCOMA SCREENING Q2Y  01/26/2020  
 EYE EXAM RETINAL OR DILATED  01/26/2020  COLONOSCOPY  07/13/2021  
 DTaP/Tdap/Td series (2 - Td) 04/21/2027  Hepatitis C Screening  Completed *Patient verbalized understanding and agreement with the plan. A copy of the After Visit Summary with personalized health plan was given to the patient today.

## 2019-04-29 NOTE — PATIENT INSTRUCTIONS
Schedule of Personalized Health Plan (Provide Copy to Patient) The best way to stay healthy is to live a healthy lifestyle. A healthy lifestyle includes regular exercise, eating a well-balanced diet, keeping a healthy weight and not smoking. Regular physical exams and screening tests are another important way to take care of yourself. Preventive exams provided by health care providers can find health problems early when treatment works best and can keep you from getting certain diseases or illnesses. Preventive services include exams, lab tests, screenings, shots, monitoring and information to help you take care of your own health. All people over 65 should have a pneumonia shot. Pneumonia shots are usually only needed once in a lifetime unless your doctor decides differently. All people over 65 should have a yearly flu shot. People over 65 are at medium to high risk for Hepatitis B. Three shots are needed for complete protection. In addition to your physical exam, some screening tests are recommended: 
 
Bone mass measurement (dexa scan) is recommended every two years if you have certain risk factors, such as personal history of vertebral fracture or chronic steroid medication use Diabetes Mellitus screening is recommended every year. Glaucoma is an eye disease caused by high pressure in the eye. An eye exam is recommended every year. Cardiovascular screening tests that check your cholesterol and other blood fat (lipid) levels are recommended every five years. Colorectal Cancer screening tests help to find pre-cancerous polyps (growths in the colon) so they can be removed before they turn into cancer. Tests ordered for screening depend on your personal and family history risk factors. Screening for Prostate Cancer is recommended yearly with a digital rectal exam and/or a PSA test 
 
Here is a list of your current Health Maintenance items with a due date: 
Health Maintenance Topic Date Due  Shingrix Vaccine Age 50> (1 of 2) 08/09/1997  Pneumococcal 65+ years (1 of 2 - PCV13) 08/09/2012  MICROALBUMIN Q1  01/26/2019  
 FOOT EXAM Q1  01/28/2019  Influenza Age 5 to Adult  08/01/2019  LIPID PANEL Q1  08/31/2019  
 HEMOGLOBIN A1C Q6M  10/29/2019  GLAUCOMA SCREENING Q2Y  01/26/2020  
 EYE EXAM RETINAL OR DILATED  01/26/2020  COLONOSCOPY  07/13/2021  
 DTaP/Tdap/Td series (2 - Td) 04/21/2027  Hepatitis C Screening  Completed

## 2019-04-29 NOTE — PROGRESS NOTES
Subjective:  
Patient is a 72y.o. year old male who presents for Annual Wellness Visit; Diabetes; Hypertension; and Depression 1. DM:  He's fasting today. He doesn't check sugars. He's had a bad winter because it was cold and didn't do as much walking and a little depressed. But now he's walking again, 3 miles/day. Also joined a gym so walking there. He's doing some traveling and taking a cruise. Last A1c was 6.1. He's just on Januvia for DM. Not on metformin because got mouth swelling on that. The Januvia he takes 1/2 every other day. 2.  HTN:  On Hyzaar daily as well as Norvasc. BP a little high but gets some white coat HTN. 3.  Gout:  On allopurinol twice weekly. No attacks I a long time 4. Allergies: On Xyzal and Flonase. 5.  Hernia:  He's had this a long time. He has a periumbilical hernia. Near but not at the site of previous surgery for colon cancer. He has h/o hernia repair but doesn't remember where. He's had the hernia for around 14 years. He feels like sometimes it's harder than in the past.  Doesn't hurt unless he presses on it. HM:  UTD on colonoscopy:  Until 2021. Review of Systems Constitutional: Negative. Cardiovascular: Negative. Endo/Heme/Allergies: Negative. Current Outpatient Medications on File Prior to Visit Medication Sig Dispense Refill  losartan-hydroCHLOROthiazide (HYZAAR) 100-12.5 mg per tablet TAKE 1 TABLET BY MOUTH DAILY 90 Tab 1  
 amLODIPine (NORVASC) 10 mg tablet TAKE 1 TABLET BY MOUTH DAILY 90 Tab 1  
 testosterone (ANDROGEL) 20.25 mg/1.25 gram (1.62 %) gel APPLY 2 PUMPS DAILY 3 Bottle 1  
 allopurinol (ZYLOPRIM) 300 mg tablet Take 1 Tab by mouth daily. 90 Tab 1  
 SITagliptin (JANUVIA) 100 mg tablet Take 1 Tab by mouth daily.  90 Tab 1  
 levocetirizine (XYZAL) 5 mg tablet TAKE 1 TABLET BY MOUTH ONCE DAILY 90 Tab 1  
 sertraline (ZOLOFT) 50 mg tablet *10/22* TAKE 1 TABLET BY MOUTH DAILY 90 Tab 1  
  aspirin delayed-release 81 mg tablet Take  by mouth daily.  cholecalciferol (VITAMIN D3) 1,000 unit tablet Take  by mouth daily. No current facility-administered medications on file prior to visit. Reviewed PmHx, RxHx, FmHx, SocHx, AllgHx and updated and dated in the chart. Nurse notes were reviewed and are correct Objective:  
 
Vitals:  
 04/29/19 1042 04/29/19 1043 BP: (!) 168/91 (!) 165/91 Pulse: 77 Resp: 18 Temp: 98 °F (36.7 °C) TempSrc: Oral   
SpO2: 98% Weight: 219 lb (99.3 kg) Height: 5' 9\" (1.753 m) Physical Exam  
Constitutional: He is oriented to person, place, and time. He appears well-developed and well-nourished. No distress. HENT:  
Head: Normocephalic and atraumatic. Cardiovascular: Normal rate, regular rhythm, normal heart sounds and intact distal pulses. Exam reveals no gallop and no friction rub. No murmur heard. Pulmonary/Chest: Effort normal and breath sounds normal. No respiratory distress. Abdominal: Soft. Bowel sounds are normal. He exhibits no distension. There is no tenderness. Has scar from previous surgery at midline. Some other scarring around the RLQ. Has hernia that is tender and not reducible to right of the umbilicus Musculoskeletal: He exhibits no edema. Lymphadenopathy:  
  He has no cervical adenopathy. Neurological: He is alert and oriented to person, place, and time. Skin: Skin is warm and dry. Psychiatric: He has a normal mood and affect. His behavior is normal.  
Nursing note and vitals reviewed. Diabetic foot exam:  
 
Left: Reflexes 2+ Filament test 3/6 Pulse DP: 2+ (normal) Pulse PT: 1+ (weak) Deformities: None Right: Reflexes 2+ Filament test 5/6 Pulse DP: 1+ (weak) Pulse PT: 1+ (weak) Deformities: None Has h/o chemo and radiation which is probable cause of neuropathy.  
 
Assessment/ Plan:  
Diagnoses and all orders for this visit: 
 
 1. Essential hypertension:  BP high but didn't take his meds so no changes and he will take his morning meds. I told him to take his meds next time he comes for appt 
-     COLLECTION VENOUS BLOOD,VENIPUNCTURE 
-     METABOLIC PANEL, COMPREHENSIVE; Future -     CBC WITH AUTOMATED DIFF; Future 2. Controlled type 2 diabetes mellitus with diabetic polyneuropathy, without long-term current use of insulin [facility]):  Checking labs. The neuropathy is probably from chemo and not from DM. DM well controlled. -     HEMOGLOBIN A1C WITH EAG; Future -     LIPID PANEL; Future -     AMB POC URINE, MICROALBUMIN, SEMIQUANTITATIVE 
-     HM DIABETES FOOT EXAM 
 
3. Chronic gout without tophus, unspecified cause, unspecified site:  Continue low dose allopurinol 4. Allergic rhinitis, unspecified seasonality, unspecified trigger: Well controlled. Continue current meds. 5. Vitamin D deficiency 
-     VITAMIN D, 25 HYDROXY; Future 6. Periumbilical hernia:  Sending to surgery to evaluate. 
-     REFERRAL TO GENERAL SURGERY 7. Encounter for immunization -     pneumococcal 13 flora conj dip (PREVNAR-13) 0.5 mL syrg injection; 0.5 mL by IntraMUSCular route once for 1 dose. I have discussed the diagnosis with the patient and the intended plan as seen in the above orders. The patient verbalized understanding and agrees with the plan. Follow-up and Dispositions · Return in about 6 months (around 10/29/2019) for combo clinic.  
  
 
 
Jonathan Lisa MD

## 2019-04-29 NOTE — PROGRESS NOTES
Chief Complaint Patient presents with [facility] Annual Wellness Visit  Diabetes  Hypertension  Depression 1. Have you been to the ER, urgent care clinic since your last visit? Hospitalized since your last visit? No 
 
2. Have you seen or consulted any other health care providers outside of the [address] [place] [address] since your last visit? Include any pap smears or colon screening. No  
 
ADL Assessment 4/29/2019 Feeding yourself No Help Needed Getting from bed to chair No Help Needed Getting dressed No Help Needed Bathing or showering No Help Needed Walk across the room (includes cane/walker) No Help Needed Using the telphone No Help Needed Taking your medications No Help Needed Preparing meals No Help Needed Managing money (expenses/bills) No Help Needed Moderately strenuous housework (laundry) No Help Needed Shopping for personal items (toiletries/medicines) No Help Needed Shopping for groceries No Help Needed Driving No Help Needed Climbing a flight of stairs No Help Needed Getting to places beyond walking distances No Help Needed Labs ordered by provider Blood drawn from right arm Pt tolerated well by Ynes Gonzales

## 2019-04-30 LAB
25(OH)D3+25(OH)D2 SERPL-MCNC: 36.6 NG/ML (ref 30–100)
ALBUMIN SERPL-MCNC: 4.7 G/DL (ref 3.5–4.8)
ALBUMIN/GLOB SERPL: 1.9 {RATIO} (ref 1.2–2.2)
ALP SERPL-CCNC: 54 IU/L (ref 39–117)
ALT SERPL-CCNC: 48 IU/L (ref 0–44)
AST SERPL-CCNC: 31 IU/L (ref 0–40)
BASOPHILS # BLD AUTO: 0 X10E3/UL (ref 0–0.2)
BASOPHILS NFR BLD AUTO: 0 %
BILIRUB SERPL-MCNC: 0.7 MG/DL (ref 0–1.2)
BUN SERPL-MCNC: 26 MG/DL (ref 8–27)
BUN/CREAT SERPL: 17 (ref 10–24)
CALCIUM SERPL-MCNC: 9.4 MG/DL (ref 8.6–10.2)
CHLORIDE SERPL-SCNC: 100 MMOL/L (ref 96–106)
CHOLEST SERPL-MCNC: 176 MG/DL (ref 100–199)
CO2 SERPL-SCNC: 26 MMOL/L (ref 20–29)
CREAT SERPL-MCNC: 1.56 MG/DL (ref 0.76–1.27)
EOSINOPHIL # BLD AUTO: 0.3 X10E3/UL (ref 0–0.4)
EOSINOPHIL NFR BLD AUTO: 3 %
ERYTHROCYTE [DISTWIDTH] IN BLOOD BY AUTOMATED COUNT: 14.1 % (ref 12.3–15.4)
EST. AVERAGE GLUCOSE BLD GHB EST-MCNC: 160 MG/DL
GLOBULIN SER CALC-MCNC: 2.5 G/DL (ref 1.5–4.5)
GLUCOSE SERPL-MCNC: 164 MG/DL (ref 65–99)
HBA1C MFR BLD: 7.2 % (ref 4.8–5.6)
HCT VFR BLD AUTO: 49.1 % (ref 37.5–51)
HDLC SERPL-MCNC: 45 MG/DL
HGB BLD-MCNC: 16.7 G/DL (ref 13–17.7)
IMM GRANULOCYTES # BLD AUTO: 0 X10E3/UL (ref 0–0.1)
IMM GRANULOCYTES NFR BLD AUTO: 0 %
LDLC SERPL CALC-MCNC: 94 MG/DL (ref 0–99)
LYMPHOCYTES # BLD AUTO: 1.9 X10E3/UL (ref 0.7–3.1)
LYMPHOCYTES NFR BLD AUTO: 20 %
MCH RBC QN AUTO: 30 PG (ref 26.6–33)
MCHC RBC AUTO-ENTMCNC: 34 G/DL (ref 31.5–35.7)
MCV RBC AUTO: 88 FL (ref 79–97)
MONOCYTES # BLD AUTO: 0.6 X10E3/UL (ref 0.1–0.9)
MONOCYTES NFR BLD AUTO: 7 %
NEUTROPHILS # BLD AUTO: 6.5 X10E3/UL (ref 1.4–7)
NEUTROPHILS NFR BLD AUTO: 70 %
PLATELET # BLD AUTO: 212 X10E3/UL (ref 150–379)
POTASSIUM SERPL-SCNC: 4.3 MMOL/L (ref 3.5–5.2)
PROT SERPL-MCNC: 7.2 G/DL (ref 6–8.5)
RBC # BLD AUTO: 5.57 X10E6/UL (ref 4.14–5.8)
SODIUM SERPL-SCNC: 140 MMOL/L (ref 134–144)
TRIGL SERPL-MCNC: 183 MG/DL (ref 0–149)
VLDLC SERPL CALC-MCNC: 37 MG/DL (ref 5–40)
WBC # BLD AUTO: 9.3 X10E3/UL (ref 3.4–10.8)

## 2019-04-30 NOTE — PROGRESS NOTES
His A1c went up a little to 6.2. That would go with the bad winter. I'm hoping he can work hard on diet and exercise and get that below 7 again. His kidney function was stable but continues to be moderately decreased. Other labs looked good.

## 2019-05-20 DIAGNOSIS — I10 ESSENTIAL HYPERTENSION: ICD-10-CM

## 2019-05-22 RX ORDER — LOSARTAN POTASSIUM AND HYDROCHLOROTHIAZIDE 12.5; 1 MG/1; MG/1
TABLET ORAL
Qty: 90 TAB | Refills: 1 | Status: SHIPPED | OUTPATIENT
Start: 2019-05-22 | End: 2019-11-05 | Stop reason: SDUPTHER

## 2019-05-22 RX ORDER — AMLODIPINE BESYLATE 10 MG/1
TABLET ORAL
Qty: 90 TAB | Refills: 1 | Status: SHIPPED | OUTPATIENT
Start: 2019-05-22 | End: 2019-11-05 | Stop reason: SDUPTHER

## 2019-06-05 DIAGNOSIS — J30.9 ALLERGIC RHINITIS, UNSPECIFIED SEASONALITY, UNSPECIFIED TRIGGER: ICD-10-CM

## 2019-06-05 DIAGNOSIS — E29.9 TESTICULAR DYSFUNCTION: ICD-10-CM

## 2019-06-05 NOTE — TELEPHONE ENCOUNTER
Spoke with pt about updating appt time for 10/29/2019. Asked pt if he needed any refills. Pt stated he is running low on androgel & xyzal. Can we please order a refill for these and send them to [facility]?

## 2019-06-06 RX ORDER — LEVOCETIRIZINE DIHYDROCHLORIDE 5 MG/1
5 TABLET, FILM COATED ORAL DAILY
Qty: 90 TAB | Refills: 1 | Status: SHIPPED | OUTPATIENT
Start: 2019-06-06 | End: 2019-11-05 | Stop reason: SDUPTHER

## 2019-06-06 RX ORDER — TESTOSTERONE 20.25 MG/1.25G
GEL TOPICAL
Qty: 3 BOTTLE | Refills: 1 | Status: SHIPPED | OUTPATIENT
Start: 2019-06-06 | End: 2019-11-05 | Stop reason: SDUPTHER

## 2019-06-18 ENCOUNTER — OFFICE VISIT (OUTPATIENT)
Dept: INTERNAL MEDICINE CLINIC | Age: 74
End: 2019-06-18

## 2019-06-18 VITALS
WEIGHT: 212 LBS | DIASTOLIC BLOOD PRESSURE: 68 MMHG | TEMPERATURE: 98.3 F | OXYGEN SATURATION: 96 % | HEART RATE: 76 BPM | SYSTOLIC BLOOD PRESSURE: 122 MMHG | BODY MASS INDEX: 31.4 KG/M2 | HEIGHT: 69 IN | RESPIRATION RATE: 18 BRPM

## 2019-06-18 DIAGNOSIS — J40 BRONCHITIS: Primary | ICD-10-CM

## 2019-06-18 RX ORDER — CODEINE PHOSPHATE AND GUAIFENESIN 10; 100 MG/5ML; MG/5ML
5-10 SOLUTION ORAL
Qty: 180 ML | Refills: 0 | Status: SHIPPED | OUTPATIENT
Start: 2019-06-18 | End: 2019-07-02

## 2019-06-18 RX ORDER — BENZONATATE 200 MG/1
200 CAPSULE ORAL
Qty: 30 CAP | Refills: 1 | Status: SHIPPED | OUTPATIENT
Start: 2019-06-18 | End: 2019-11-06

## 2019-06-18 RX ORDER — AZITHROMYCIN 250 MG/1
TABLET, FILM COATED ORAL
Qty: 6 TAB | Refills: 0 | Status: SHIPPED | OUTPATIENT
Start: 2019-06-18 | End: 2019-11-05

## 2019-06-18 NOTE — PROGRESS NOTES
Subjective:   Patient is a 72y.o. year old male who presents for Cough  Cough:  4 days ago was in [place]. He picked something up there. Having cough, loose stools. Cough is severe, causing some pain in the body. Coughing up some brown/yellow sputum. No SOB. Had some fever the other day. No rhinorrhea, congestion. No sinus pain. Has never smoked. Review of Systems   Constitutional: Negative. Cardiovascular: Negative. Current Outpatient Medications on File Prior to Visit   Medication Sig Dispense Refill    levocetirizine (XYZAL) 5 mg tablet Take 1 Tab by mouth daily. 90 Tab 1    testosterone (ANDROGEL) 20.25 mg/1.25 gram (1.62 %) gel APPLY 2 PUMPS DAILY 3 Bottle 1    losartan-hydroCHLOROthiazide (HYZAAR) 100-12.5 mg per tablet TAKE 1 TABLET EVERY DAY 90 Tab 1    amLODIPine (NORVASC) 10 mg tablet TAKE 1 TABLET EVERY DAY 90 Tab 1    allopurinol (ZYLOPRIM) 300 mg tablet Take 1 Tab by mouth daily. 90 Tab 1    SITagliptin (JANUVIA) 100 mg tablet Take 1 Tab by mouth daily. 90 Tab 1    sertraline (ZOLOFT) 50 mg tablet *10/22* TAKE 1 TABLET BY MOUTH DAILY 90 Tab 1    aspirin delayed-release 81 mg tablet Take  by mouth daily.  cholecalciferol (VITAMIN D3) 1,000 unit tablet Take  by mouth daily. No current facility-administered medications on file prior to visit. Reviewed PmHx, RxHx, FmHx, SocHx, AllgHx and updated and dated in the chart. Nurse notes were reviewed and are correct    Objective:     Vitals:    06/18/19 0930   BP: 122/68   Pulse: 76   Resp: 18   Temp: 98.3 °F (36.8 °C)   TempSrc: Oral   SpO2: 96%   Weight: 212 lb (96.2 kg)   Height: 5' 9\" (1.753 m)     Physical Exam   Constitutional: He appears well-developed and well-nourished. No distress. HENT:   Head: Normocephalic and atraumatic. Right Ear: External ear normal.   Left Ear: External ear normal.   Nose: Nose normal.   Mouth/Throat: Oropharynx is clear and moist. No oropharyngeal exudate.    Eyes: Pupils are equal, round, and reactive to light. Conjunctivae and EOM are normal.   Neck: Normal range of motion. Neck supple. Cardiovascular: Normal rate, regular rhythm and normal heart sounds. Exam reveals no gallop and no friction rub. No murmur heard. Pulmonary/Chest: Effort normal and breath sounds normal. No respiratory distress. He has no wheezes. Lymphadenopathy:     He has no cervical adenopathy. Nursing note and vitals reviewed. Assessment/ Plan:     Diagnoses and all orders for this visit:    1. Bronchitis  -     azithromycin (ZITHROMAX Z-ESTER) 250 mg tablet; Take two tablets today then one tablet daily  -     guaiFENesin-codeine (ROBITUSSIN AC) 100-10 mg/5 mL solution; Take 5-10 mL by mouth three (3) times daily as needed for Cough for up to 14 days. Max Daily Amount: 30 mL. -     benzonatate (TESSALON) 200 mg capsule; Take 1 Cap by mouth three (3) times daily as needed for Cough. Will use tessalon in the day and Robitussin AC at night. I have discussed the diagnosis with the patient and the intended plan as seen in the above orders. The patient verbalized understanding and agrees with the plan. Follow-up and Dispositions    · Return if symptoms worsen or fail to improve.          Sathya Pope MD

## 2019-06-18 NOTE — PROGRESS NOTES
Chief Complaint   Patient presents with    Cough   1. Have you been to the ER, urgent care clinic since your last visit? Hospitalized since your last visit? No    2. Have you seen or consulted any other health care providers outside of the [address] [place] [address] since your last visit? Include any pap smears or colon screening.  No

## 2019-06-25 ENCOUNTER — OFFICE VISIT (OUTPATIENT)
Dept: INTERNAL MEDICINE CLINIC | Age: 74
End: 2019-06-25

## 2019-06-25 VITALS
DIASTOLIC BLOOD PRESSURE: 75 MMHG | TEMPERATURE: 98.3 F | HEIGHT: 69 IN | HEART RATE: 79 BPM | BODY MASS INDEX: 31.1 KG/M2 | RESPIRATION RATE: 18 BRPM | WEIGHT: 210 LBS | OXYGEN SATURATION: 96 % | SYSTOLIC BLOOD PRESSURE: 130 MMHG

## 2019-06-25 DIAGNOSIS — H54.61 DECREASED VISION OF RIGHT EYE: Primary | ICD-10-CM

## 2019-06-25 DIAGNOSIS — J40 BRONCHITIS: ICD-10-CM

## 2019-06-25 DIAGNOSIS — Z86.69 HISTORY OF CATARACT: ICD-10-CM

## 2019-06-25 DIAGNOSIS — Z86.69 HISTORY OF RETINAL TEAR: ICD-10-CM

## 2019-06-25 DIAGNOSIS — R23.3 ABNORMAL BRUISING: ICD-10-CM

## 2019-06-25 NOTE — PROGRESS NOTES
Chief Complaint   Patient presents with    Referral Request    Cough     follow up    Bleeding/Bruising     bruising around lower abdomen   1. Have you been to the ER, urgent care clinic since your last visit? Hospitalized since your last visit? No    2. Have you seen or consulted any other health care providers outside of the [facility] since your last visit? Include any pap smears or colon screening.  No

## 2019-06-25 NOTE — PROGRESS NOTES
Subjective:   Patient is a 72y.o. year old male who presents for Referral Request; Cough (follow up); and Bleeding/Bruising (bruising around lower abdomen)  Bronchitis:  Follow up from last week. I have him Z-pack and cough meds. He says the cough is better, sleeping better, and appetite back. Still has a cough but not as bad. Finished the Z-pack. Still having some sputum production. No fevers. Abdominal pain:  Right abdomen. He was thrashing around in his sleep and now having pain which seems to be muscular. Eye problem:  Has a problem with the right eye. Thinks could be a retinal tear. He had a retinal tear in the left eye years ago and seems similar. The right eye was his good eye but not it's his bad eye. He went to an eye specialist before but he didn't find anything except maybe a small tear. Review of Systems   Constitutional: Negative. Cardiovascular: Negative. Current Outpatient Medications on File Prior to Visit   Medication Sig Dispense Refill    guaiFENesin-codeine (ROBITUSSIN AC) 100-10 mg/5 mL solution Take 5-10 mL by mouth three (3) times daily as needed for Cough for up to 14 days. Max Daily Amount: 30 mL. 180 mL 0    benzonatate (TESSALON) 200 mg capsule Take 1 Cap by mouth three (3) times daily as needed for Cough. 30 Cap 1    levocetirizine (XYZAL) 5 mg tablet Take 1 Tab by mouth daily. 90 Tab 1    testosterone (ANDROGEL) 20.25 mg/1.25 gram (1.62 %) gel APPLY 2 PUMPS DAILY 3 Bottle 1    losartan-hydroCHLOROthiazide (HYZAAR) 100-12.5 mg per tablet TAKE 1 TABLET EVERY DAY 90 Tab 1    amLODIPine (NORVASC) 10 mg tablet TAKE 1 TABLET EVERY DAY 90 Tab 1    allopurinol (ZYLOPRIM) 300 mg tablet Take 1 Tab by mouth daily. 90 Tab 1    SITagliptin (JANUVIA) 100 mg tablet Take 1 Tab by mouth daily. 90 Tab 1    sertraline (ZOLOFT) 50 mg tablet *10/22* TAKE 1 TABLET BY MOUTH DAILY 90 Tab 1    aspirin delayed-release 81 mg tablet Take  by mouth daily.       cholecalciferol (VITAMIN D3) 1,000 unit tablet Take  by mouth daily.  azithromycin (ZITHROMAX Z-ESTER) 250 mg tablet Take two tablets today then one tablet daily 6 Tab 0     No current facility-administered medications on file prior to visit. Reviewed PmHx, RxHx, FmHx, SocHx, AllgHx and updated and dated in the chart. Nurse notes were reviewed and are correct    Objective:     Vitals:    06/25/19 0933   BP: 130/75   Pulse: 79   Resp: 18   Temp: 98.3 °F (36.8 °C)   TempSrc: Oral   SpO2: 96%   Weight: 210 lb (95.3 kg)   Height: 5' 9\" (1.753 m)     Physical Exam   Constitutional: He is oriented to person, place, and time. He appears well-developed and well-nourished. No distress. HENT:   Head: Normocephalic and atraumatic. Eyes: Conjunctivae and EOM are normal.   Neck: Normal range of motion. Neck supple. Cardiovascular: Normal rate, regular rhythm, normal heart sounds and intact distal pulses. Exam reveals no gallop and no friction rub. No murmur heard. Pulmonary/Chest: Effort normal and breath sounds normal. No respiratory distress. Abdominal: Soft. Bowel sounds are normal. He exhibits no distension. There is no tenderness. There is bruising on the right side of the lower abdomen in the skin in the RLQ. There is old scar at midline and some hardness under the skin on the right side which could be old scarring. No current hernia seen or palpable   Musculoskeletal: Normal range of motion. He exhibits no edema. Lymphadenopathy:     He has no cervical adenopathy. Neurological: He is alert and oriented to person, place, and time. Skin: Skin is warm and dry. No rash noted. He is not diaphoretic. No erythema. No pallor. Psychiatric: He has a normal mood and affect. His behavior is normal. Judgment and thought content normal.   Nursing note and vitals reviewed. Assessment/ Plan:     Diagnoses and all orders for this visit:    1.  Decreased vision of right eye:  Sending urgently to eye doctor because of this. Worried about retinal tear. -     REFERRAL TO OPHTHALMOLOGY    2. History of cataract  -     REFERRAL TO OPHTHALMOLOGY    3. History of retinal tear  -     REFERRAL TO OPHTHALMOLOGY    4. Bronchitis: This is slowly resolving, seems to be post-viral syndrome. Getting better so continue to follow. 5. Abnormal bruising:  Seems to have been from a tear of his rectus muscle, maybe from coughing. No more tenderness, so I think it's resolving. I have discussed the diagnosis with the patient and the intended plan as seen in the above orders. The patient verbalized understanding and agrees with the plan. Follow-up and Dispositions    · Return if symptoms worsen or fail to improve.          Jhon Apley, MD

## 2019-08-22 ENCOUNTER — OFFICE VISIT (OUTPATIENT)
Dept: INTERNAL MEDICINE CLINIC | Age: 74
End: 2019-08-22

## 2019-08-22 VITALS
BODY MASS INDEX: 31.84 KG/M2 | HEIGHT: 69 IN | WEIGHT: 215 LBS | OXYGEN SATURATION: 97 % | SYSTOLIC BLOOD PRESSURE: 138 MMHG | RESPIRATION RATE: 18 BRPM | TEMPERATURE: 98.1 F | HEART RATE: 83 BPM | DIASTOLIC BLOOD PRESSURE: 82 MMHG

## 2019-08-22 DIAGNOSIS — D17.1 LIPOMA OF TORSO: Primary | ICD-10-CM

## 2019-08-22 NOTE — PROGRESS NOTES
Subjective:   Patient is a 69y.o. year old male who presents for Mass (right upper shoulder blade)  Has growth on the upper portion of the right side of the back. He never noticed it. But gets monthly massage an 2 weeks ago when had it his masseuse felt it. She suggested he have me look at it. Not hurting him. He doesn't know how long it's been there. No symptoms. Doesn't know if it's growing but doesn't think it's growing quickly. Has h/o rectal cancer 14 years ago. That was taken care of in [place] and they said he was cured. All he needs now is monitoring by colonoscopy. Next colonoscopy is due in 2021. Review of Systems   Constitutional: Negative. Cardiovascular: Negative. Current Outpatient Medications on File Prior to Visit   Medication Sig Dispense Refill    levocetirizine (XYZAL) 5 mg tablet Take 1 Tab by mouth daily. 90 Tab 1    testosterone (ANDROGEL) 20.25 mg/1.25 gram (1.62 %) gel APPLY 2 PUMPS DAILY 3 Bottle 1    losartan-hydroCHLOROthiazide (HYZAAR) 100-12.5 mg per tablet TAKE 1 TABLET EVERY DAY 90 Tab 1    amLODIPine (NORVASC) 10 mg tablet TAKE 1 TABLET EVERY DAY 90 Tab 1    allopurinol (ZYLOPRIM) 300 mg tablet Take 1 Tab by mouth daily. 90 Tab 1    SITagliptin (JANUVIA) 100 mg tablet Take 1 Tab by mouth daily. 90 Tab 1    sertraline (ZOLOFT) 50 mg tablet *10/22* TAKE 1 TABLET BY MOUTH DAILY 90 Tab 1    aspirin delayed-release 81 mg tablet Take  by mouth daily.  cholecalciferol (VITAMIN D3) 1,000 unit tablet Take  by mouth daily.  azithromycin (ZITHROMAX Z-ESTER) 250 mg tablet Take two tablets today then one tablet daily 6 Tab 0    benzonatate (TESSALON) 200 mg capsule Take 1 Cap by mouth three (3) times daily as needed for Cough. 30 Cap 1     No current facility-administered medications on file prior to visit. Reviewed PmHx, RxHx, FmHx, SocHx, AllgHx and updated and dated in the chart.     Nurse notes were reviewed and are correct    Objective: Vitals:    08/22/19 0853   BP: 138/82   Pulse: 83   Resp: 18   Temp: 98.1 °F (36.7 °C)   TempSrc: Oral   SpO2: 97%   Weight: 215 lb (97.5 kg)   Height: 5' 9\" (1.753 m)     Physical Exam   Constitutional: He is oriented to person, place, and time. He appears well-developed and well-nourished. No distress. HENT:   Head: Normocephalic and atraumatic. Eyes: Conjunctivae and EOM are normal.   Neck: Normal range of motion. Neck supple. Cardiovascular: Normal rate, regular rhythm, normal heart sounds and intact distal pulses. Exam reveals no gallop and no friction rub. No murmur heard. Pulmonary/Chest: Effort normal and breath sounds normal. No respiratory distress. Abdominal: Soft. Bowel sounds are normal. He exhibits no distension. There is no tenderness. Musculoskeletal: Normal range of motion. He exhibits no edema. Back:  2 cm diameter oblong mass under the skin that is well circumscribed, flattish, and mobile. Has firm consistency. Non-tender. No surround skin abnormalities. Lymphadenopathy:     He has no cervical adenopathy. Neurological: He is alert and oriented to person, place, and time. Skin: Skin is warm and dry. No rash noted. He is not diaphoretic. No erythema. No pallor. Psychiatric: He has a normal mood and affect. His behavior is normal. Judgment and thought content normal.   Nursing note and vitals reviewed. Assessment/ Plan:     Diagnoses and all orders for this visit:    1. Lipoma of torso: Told him this appears benign, possibly lipoma or cyst.  I told him to have his masseuse keep an eye on it, and if growing rapidly to let me know and I'll send to surgery. Or if starts to bother him. Does have rectal CA hx but this doesn't appear related. I have discussed the diagnosis with the patient and the intended plan as seen in the above orders. The patient verbalized understanding and agrees with the plan.     Follow-up and Dispositions    · Return if symptoms worsen or fail to improve.          Tika Sheriff MD

## 2019-10-01 ENCOUNTER — OFFICE VISIT (OUTPATIENT)
Dept: INTERNAL MEDICINE CLINIC | Age: 74
End: 2019-10-01

## 2019-10-01 VITALS
HEIGHT: 69 IN | OXYGEN SATURATION: 99 % | BODY MASS INDEX: 31.99 KG/M2 | TEMPERATURE: 98 F | WEIGHT: 216 LBS | SYSTOLIC BLOOD PRESSURE: 147 MMHG | RESPIRATION RATE: 18 BRPM | HEART RATE: 75 BPM | DIASTOLIC BLOOD PRESSURE: 74 MMHG

## 2019-10-01 DIAGNOSIS — M25.562 ACUTE PAIN OF LEFT KNEE: Primary | ICD-10-CM

## 2019-10-01 NOTE — PROGRESS NOTES
Subjective:   Patient is a 69y.o. year old male who presents for Knee Pain (left)  Knee pain:  Left knee pain:  For a few weeks. Went on a cruise and it made it worse. He thought maybe he twisted it. But not sure. Wonders if has fluid on it or if tore ligament. Has a dog who pulls him when goes for walk. So that could have stressed it. He thinks it's a little more swollen than the right. Some pain when crossing the legs. Feels like it may buckle when he first gets up. Has some pain when walking. Jaw pain:  Left side. Having some new pain. Has an old mouth guard for TMJ. Will see a dentist tomorrow. Wants me to look at this time. Review of Systems   Constitutional: Negative. Current Outpatient Medications on File Prior to Visit   Medication Sig Dispense Refill    levocetirizine (XYZAL) 5 mg tablet Take 1 Tab by mouth daily. 90 Tab 1    testosterone (ANDROGEL) 20.25 mg/1.25 gram (1.62 %) gel APPLY 2 PUMPS DAILY 3 Bottle 1    losartan-hydroCHLOROthiazide (HYZAAR) 100-12.5 mg per tablet TAKE 1 TABLET EVERY DAY 90 Tab 1    amLODIPine (NORVASC) 10 mg tablet TAKE 1 TABLET EVERY DAY 90 Tab 1    allopurinol (ZYLOPRIM) 300 mg tablet Take 1 Tab by mouth daily. 90 Tab 1    SITagliptin (JANUVIA) 100 mg tablet Take 1 Tab by mouth daily. 90 Tab 1    sertraline (ZOLOFT) 50 mg tablet *10/22* TAKE 1 TABLET BY MOUTH DAILY 90 Tab 1    aspirin delayed-release 81 mg tablet Take  by mouth daily.  cholecalciferol (VITAMIN D3) 1,000 unit tablet Take  by mouth daily.  azithromycin (ZITHROMAX Z-ESTER) 250 mg tablet Take two tablets today then one tablet daily 6 Tab 0    benzonatate (TESSALON) 200 mg capsule Take 1 Cap by mouth three (3) times daily as needed for Cough. 30 Cap 1     No current facility-administered medications on file prior to visit. Reviewed PmHx, RxHx, FmHx, SocHx, AllgHx and updated and dated in the chart.     Nurse notes were reviewed and are correct    Objective: Vitals:    10/01/19 0825   BP: 147/74   Pulse: 75   Resp: 18   Temp: 98 °F (36.7 °C)   TempSrc: Oral   SpO2: 99%   Weight: 216 lb (98 kg)   Height: 5' 9\" (1.753 m)     Physical Exam   Constitutional: He is oriented to person, place, and time. He appears well-developed and well-nourished. No distress. HENT:   Head: Normocephalic and atraumatic. Left jaw crepitus and some pain with opening and closing. Probable TMJ. No lesions or abnormalities in the mouth   Eyes: Conjunctivae and EOM are normal.   Neck: Normal range of motion. Neck supple. Cardiovascular: Normal rate, regular rhythm, normal heart sounds and intact distal pulses. Exam reveals no gallop and no friction rub. No murmur heard. Pulmonary/Chest: Effort normal and breath sounds normal. No respiratory distress. Abdominal: Soft. Bowel sounds are normal. He exhibits no distension. There is no tenderness. Musculoskeletal: Normal range of motion. He exhibits no edema. Left knee:  Looks a little swollen compared to the right. Some pain with flexion but fairly good ROM. Some tenderness in medial joint line and along top of patella. No erythema or warmth of the knee   Lymphadenopathy:     He has no cervical adenopathy. Neurological: He is alert and oriented to person, place, and time. Skin: Skin is warm and dry. No rash noted. He is not diaphoretic. No erythema. No pallor. Psychiatric: He has a normal mood and affect. His behavior is normal. Judgment and thought content normal.   Nursing note and vitals reviewed. Assessment/ Plan:     Diagnoses and all orders for this visit:    1. Acute pain of left knee:  Diff includes arthritis, meniscal injury, gout. Not swollen enough for acute gout attack so that's unlikely. Knee x-ray looks benign except for some medial joint narrowing. Will await radiology report. I think it could be a mild meniscal injury. Will treat supportively with ice, rest, compression (knee sleeve).   If not getting better in 1-2 weeks, will refer to ortho. -     XR KNEE LT 3 V; Future       I have discussed the diagnosis with the patient and the intended plan as seen in the above orders. The patient verbalized understanding and agrees with the plan. Follow-up and Dispositions    · Return if symptoms worsen or fail to improve.          Velma Coombs MD

## 2019-10-01 NOTE — PATIENT INSTRUCTIONS
Use ice to the knee 3-4 times a day, for 10-15 minutes. You can get a knee sleeve with hole for knee cap.

## 2019-10-01 NOTE — PROGRESS NOTES
Chief Complaint   Patient presents with    Knee Pain     left   1. Have you been to the ER, urgent care clinic since your last visit? Hospitalized since your last visit? No    2. Have you seen or consulted any other health care providers outside of the [address] [place] [address] since your last visit? Include any pap smears or colon screening.  No

## 2019-10-25 ENCOUNTER — TELEPHONE (OUTPATIENT)
Dept: INTERNAL MEDICINE CLINIC | Age: 74
End: 2019-10-25

## 2019-10-25 DIAGNOSIS — E11.42 CONTROLLED TYPE 2 DIABETES MELLITUS WITH DIABETIC POLYNEUROPATHY, WITHOUT LONG-TERM CURRENT USE OF INSULIN (HCC): ICD-10-CM

## 2019-10-25 DIAGNOSIS — E29.9 TESTICULAR DYSFUNCTION: ICD-10-CM

## 2019-10-25 DIAGNOSIS — E55.9 VITAMIN D DEFICIENCY: ICD-10-CM

## 2019-10-25 DIAGNOSIS — I10 ESSENTIAL HYPERTENSION: Primary | ICD-10-CM

## 2019-10-25 NOTE — TELEPHONE ENCOUNTER
Patient is coming in for an HTN/Diabetic 6 month appointment:    Future Appointments   Date Time Provider Chloe Luther   11/1/2019 10:00 AM [facility]     Would like to have labs drawn prior to his appointment. Patient will be in the office on Monday to have these labs drawn.

## 2019-11-05 ENCOUNTER — OFFICE VISIT (OUTPATIENT)
Dept: INTERNAL MEDICINE CLINIC | Age: 74
End: 2019-11-05

## 2019-11-05 ENCOUNTER — HOSPITAL ENCOUNTER (OUTPATIENT)
Dept: LAB | Age: 74
Discharge: HOME | End: 2019-11-05
Payer: MEDICARE

## 2019-11-05 VITALS
RESPIRATION RATE: 18 BRPM | BODY MASS INDEX: 31.9 KG/M2 | OXYGEN SATURATION: 95 % | DIASTOLIC BLOOD PRESSURE: 75 MMHG | HEART RATE: 76 BPM | TEMPERATURE: 97.9 F | HEIGHT: 69 IN | SYSTOLIC BLOOD PRESSURE: 128 MMHG

## 2019-11-05 DIAGNOSIS — I10 ESSENTIAL HYPERTENSION: ICD-10-CM

## 2019-11-05 DIAGNOSIS — J30.9 ALLERGIC RHINITIS, UNSPECIFIED SEASONALITY, UNSPECIFIED TRIGGER: ICD-10-CM

## 2019-11-05 DIAGNOSIS — F41.9 ANXIETY AND DEPRESSION: ICD-10-CM

## 2019-11-05 DIAGNOSIS — E11.42 CONTROLLED TYPE 2 DIABETES MELLITUS WITH DIABETIC POLYNEUROPATHY, WITHOUT LONG-TERM CURRENT USE OF INSULIN (HCC): ICD-10-CM

## 2019-11-05 DIAGNOSIS — M1A.9XX0 CHRONIC GOUT WITHOUT TOPHUS, UNSPECIFIED CAUSE, UNSPECIFIED SITE: ICD-10-CM

## 2019-11-05 DIAGNOSIS — E55.9 VITAMIN D DEFICIENCY: ICD-10-CM

## 2019-11-05 DIAGNOSIS — E29.9 TESTICULAR DYSFUNCTION: ICD-10-CM

## 2019-11-05 DIAGNOSIS — Z23 ENCOUNTER FOR IMMUNIZATION: ICD-10-CM

## 2019-11-05 DIAGNOSIS — E11.42 CONTROLLED TYPE 2 DIABETES MELLITUS WITH DIABETIC POLYNEUROPATHY, WITHOUT LONG-TERM CURRENT USE OF INSULIN (HCC): Primary | ICD-10-CM

## 2019-11-05 DIAGNOSIS — F32.A ANXIETY AND DEPRESSION: ICD-10-CM

## 2019-11-05 DIAGNOSIS — N18.9 CHRONIC KIDNEY DISEASE, UNSPECIFIED CKD STAGE: ICD-10-CM

## 2019-11-05 LAB
25(OH)D3 SERPL-MCNC: 33.4 NG/ML (ref 30–100)
ALBUMIN SERPL-MCNC: 4.3 G/DL (ref 3.4–5)
ALBUMIN/GLOB SERPL: 1.2 {RATIO} (ref 0.8–1.7)
ALP SERPL-CCNC: 63 U/L (ref 45–117)
ALT SERPL-CCNC: 56 U/L (ref 16–61)
ANION GAP SERPL CALC-SCNC: 6 MMOL/L (ref 3–18)
APPEARANCE UR: CLEAR
AST SERPL-CCNC: 33 U/L (ref 10–38)
BACTERIA URNS QL MICRO: NEGATIVE /HPF
BASOPHILS # BLD: 0 K/UL (ref 0–0.1)
BASOPHILS NFR BLD: 0 % (ref 0–2)
BILIRUB SERPL-MCNC: 1 MG/DL (ref 0.2–1)
BILIRUB UR QL: NEGATIVE
BUN SERPL-MCNC: 29 MG/DL (ref 7–18)
BUN/CREAT SERPL: 18 (ref 12–20)
CALCIUM SERPL-MCNC: 9.8 MG/DL (ref 8.5–10.1)
CHLORIDE SERPL-SCNC: 101 MMOL/L (ref 100–111)
CHOLEST SERPL-MCNC: 194 MG/DL
CO2 SERPL-SCNC: 29 MMOL/L (ref 21–32)
COLOR UR: YELLOW
CREAT SERPL-MCNC: 1.64 MG/DL (ref 0.6–1.3)
DIFFERENTIAL METHOD BLD: ABNORMAL
EOSINOPHIL # BLD: 0.3 K/UL (ref 0–0.4)
EOSINOPHIL NFR BLD: 3 % (ref 0–5)
EPITH CASTS URNS QL MICRO: NORMAL /LPF (ref 0–5)
ERYTHROCYTE [DISTWIDTH] IN BLOOD BY AUTOMATED COUNT: 13.6 % (ref 11.6–14.5)
EST. AVERAGE GLUCOSE BLD GHB EST-MCNC: 157 MG/DL
GLOBULIN SER CALC-MCNC: 3.6 G/DL (ref 2–4)
GLUCOSE SERPL-MCNC: 178 MG/DL (ref 74–99)
GLUCOSE UR STRIP.AUTO-MCNC: NEGATIVE MG/DL
HBA1C MFR BLD: 7.1 % (ref 4.2–5.6)
HCT VFR BLD AUTO: 48 % (ref 36–48)
HDLC SERPL-MCNC: 46 MG/DL (ref 40–60)
HDLC SERPL: 4.2 {RATIO} (ref 0–5)
HGB BLD-MCNC: 16.6 G/DL (ref 13–16)
HGB UR QL STRIP: NEGATIVE
KETONES UR QL STRIP.AUTO: NEGATIVE MG/DL
LDLC SERPL CALC-MCNC: 115.6 MG/DL (ref 0–100)
LEUKOCYTE ESTERASE UR QL STRIP.AUTO: NEGATIVE
LIPID PROFILE,FLP: ABNORMAL
LYMPHOCYTES # BLD: 2.1 K/UL (ref 0.9–3.6)
LYMPHOCYTES NFR BLD: 24 % (ref 21–52)
MCH RBC QN AUTO: 29.6 PG (ref 24–34)
MCHC RBC AUTO-ENTMCNC: 34.6 G/DL (ref 31–37)
MCV RBC AUTO: 85.7 FL (ref 74–97)
MONOCYTES # BLD: 0.5 K/UL (ref 0.05–1.2)
MONOCYTES NFR BLD: 6 % (ref 3–10)
NEUTS SEG # BLD: 5.9 K/UL (ref 1.8–8)
NEUTS SEG NFR BLD: 67 % (ref 40–73)
NITRITE UR QL STRIP.AUTO: NEGATIVE
PH UR STRIP: 6 [PH] (ref 5–8)
PLATELET # BLD AUTO: 228 K/UL (ref 135–420)
PMV BLD AUTO: 9.6 FL (ref 9.2–11.8)
POTASSIUM SERPL-SCNC: 3.6 MMOL/L (ref 3.5–5.5)
PROT SERPL-MCNC: 7.9 G/DL (ref 6.4–8.2)
PROT UR STRIP-MCNC: 30 MG/DL
RBC # BLD AUTO: 5.6 M/UL (ref 4.7–5.5)
RBC #/AREA URNS HPF: 0 /HPF (ref 0–5)
SODIUM SERPL-SCNC: 136 MMOL/L (ref 136–145)
SP GR UR REFRACTOMETRY: 1.01 (ref 1–1.03)
TRIGL SERPL-MCNC: 162 MG/DL (ref ?–150)
UROBILINOGEN UR QL STRIP.AUTO: 0.2 EU/DL (ref 0.2–1)
VLDLC SERPL CALC-MCNC: 32.4 MG/DL
WBC # BLD AUTO: 8.9 K/UL (ref 4.6–13.2)
WBC URNS QL MICRO: NORMAL /HPF (ref 0–4)

## 2019-11-05 PROCEDURE — 85025 COMPLETE CBC W/AUTO DIFF WBC: CPT

## 2019-11-05 PROCEDURE — 82306 VITAMIN D 25 HYDROXY: CPT

## 2019-11-05 PROCEDURE — 80061 LIPID PANEL: CPT

## 2019-11-05 PROCEDURE — 81001 URINALYSIS AUTO W/SCOPE: CPT

## 2019-11-05 PROCEDURE — 36415 COLL VENOUS BLD VENIPUNCTURE: CPT

## 2019-11-05 PROCEDURE — 80053 COMPREHEN METABOLIC PANEL: CPT

## 2019-11-05 PROCEDURE — 83036 HEMOGLOBIN GLYCOSYLATED A1C: CPT

## 2019-11-05 PROCEDURE — 84403 ASSAY OF TOTAL TESTOSTERONE: CPT

## 2019-11-05 RX ORDER — LOSARTAN POTASSIUM AND HYDROCHLOROTHIAZIDE 12.5; 1 MG/1; MG/1
TABLET ORAL
Qty: 90 TAB | Refills: 1 | Status: SHIPPED | OUTPATIENT
Start: 2019-11-05 | End: 2020-03-13

## 2019-11-05 RX ORDER — CITALOPRAM 10 MG/1
10 TABLET ORAL DAILY
Qty: 90 TAB | Refills: 1 | Status: SHIPPED | OUTPATIENT
Start: 2019-11-05 | End: 2020-03-13

## 2019-11-05 RX ORDER — AMLODIPINE BESYLATE 10 MG/1
TABLET ORAL
Qty: 90 TAB | Refills: 1 | Status: SHIPPED | OUTPATIENT
Start: 2019-11-05 | End: 2020-03-13

## 2019-11-05 RX ORDER — TESTOSTERONE 20.25 MG/1.25G
GEL TOPICAL
Qty: 3 BOTTLE | Refills: 1 | Status: SHIPPED | OUTPATIENT
Start: 2019-11-05 | End: 2020-05-06 | Stop reason: SDUPTHER

## 2019-11-05 RX ORDER — LEVOCETIRIZINE DIHYDROCHLORIDE 5 MG/1
5 TABLET, FILM COATED ORAL DAILY
Qty: 90 TAB | Refills: 1 | Status: SHIPPED | OUTPATIENT
Start: 2019-11-05 | End: 2020-05-06 | Stop reason: SDUPTHER

## 2019-11-05 RX ORDER — ALLOPURINOL 300 MG/1
300 TABLET ORAL DAILY
Qty: 90 TAB | Refills: 1 | Status: SHIPPED | OUTPATIENT
Start: 2019-11-05 | End: 2019-11-07 | Stop reason: SDUPTHER

## 2019-11-05 NOTE — PATIENT INSTRUCTIONS
Vaccine Information Statement    Influenza (Flu) Vaccine (Inactivated or Recombinant): What You Need to Know    Many Vaccine Information Statements are available in [place]ian and other languages. See www.immunize.org/vis  Hojas de información sobre vacunas están disponibles en español y en muchos otros idiomas. Visite www.immunize.org/vis    1. Why get vaccinated? Influenza vaccine can prevent influenza (flu). Flu is a contagious disease that spreads around the [facility] every year, usually between October and May. Anyone can get the flu, but it is more dangerous for some people. Infants and young children, people 72years of age and older, pregnant women, and people with certain health conditions or a weakened immune system are at greatest risk of flu complications. Pneumonia, bronchitis, sinus infections and ear infections are examples of flu-related complications. If you have a medical condition, such as heart disease, cancer or diabetes, flu can make it worse. Flu can cause fever and chills, sore throat, muscle aches, fatigue, cough, headache, and runny or stuffy nose. Some people may have vomiting and diarrhea, though this is more common in children than adults. Each year thousands of people in the [facility] die from flu, and many more are hospitalized. Flu vaccine prevents millions of illnesses and flu-related visits to the doctor each year. 2. Influenza vaccines     CDC recommends everyone 10months of age and older get vaccinated every flu season. Children 6 months through 6years of age may need 2 doses during a single flu season. Everyone else needs only 1 dose each flu season. It takes about 2 weeks for protection to develop after vaccination. There are many flu viruses, and they are always changing. Each year a new flu vaccine is made to protect against three or four viruses that are likely to cause disease in the upcoming flu season.  Even when the vaccine doesnt exactly match these viruses, it may still provide some protection. Influenza vaccine does not cause flu. Influenza vaccine may be given at the same time as other vaccines. 3. Talk with your health care provider    Tell your vaccine provider if the person getting the vaccine:   Has had an allergic reaction after a previous dose of influenza vaccine, or has any severe, life-threatening allergies.  Has ever had Guillain-Barré Syndrome (also called GBS). In some cases, your health care provider may decide to postpone influenza vaccination to a future visit. People with minor illnesses, such as a cold, may be vaccinated. People who are moderately or severely ill should usually wait until they recover before getting influenza vaccine. Your health care provider can give you more information. 4. Risks of a reaction     Soreness, redness, and swelling where shot is given, fever, muscle aches, and headache can happen after influenza vaccine.  There may be a very small increased risk of Guillain-Barré Syndrome (GBS) after inactivated influenza vaccine (the flu shot). [facility] children who get the flu shot along with pneumococcal vaccine (PCV13), and/or DTaP vaccine at the same time might be slightly more likely to have a seizure caused by fever. Tell your health care provider if a child who is getting flu vaccine has ever had a seizure. People sometimes faint after medical procedures, including vaccination. Tell your provider if you feel dizzy or have vision changes or ringing in the ears. As with any medicine, there is a very remote chance of a vaccine causing a severe allergic reaction, other serious injury, or death. 5. What if there is a serious problem? An allergic reaction could occur after the vaccinated person leaves the clinic.  If you see signs of a severe allergic reaction (hives, swelling of the face and throat, difficulty breathing, a fast heartbeat, dizziness, or weakness), call 9-1-1 and get the person to the nearest hospital.    For other signs that concern you, call your health care provider. Adverse reactions should be reported to the Vaccine Adverse Event Reporting System (VAERS). Your health care provider will usually file this report, or you can do it yourself. Visit the VAERS website at www.vaers. [facility].gov or call 6-367.310.6792. VAERS is only for reporting reactions, and VAERS staff do not give medical advice. 6. The National Vaccine Injury Compensation Program    The [facility] Vaccine Injury Compensation Program (VICP) is a federal program that was created to compensate people who may have been injured by certain vaccines. Visit the VICP website at www.[facility].gov/vaccinecompensation or call 1-379.599.7061 to learn about the program and about filing a claim. There is a time limit to file a claim for compensation. 7. How can I learn more?  Ask your health care provider.  Call your local or state health department.  Contact the Centers for Disease Control and Prevention (CDC):  - Call 7-722.794.7754 (6-647-WIA-INFO) or  - Visit CDCs influenza website at www.cdc.gov/flu    Vaccine Information Statement (Interim)  Inactivated Influenza Vaccine   8/15/2019  42 [address] [place] Line 150CG-07   Department of Health and Human Services  Centers for Disease Control and Prevention    Office Use Only

## 2019-11-06 LAB
TESTOST FREE SERPL-MCNC: 8.4 PG/ML (ref 6.6–18.1)
TESTOST SERPL-MCNC: 442 NG/DL (ref 264–916)

## 2019-11-06 NOTE — PROGRESS NOTES
HISTORY OF PRESENT ILLNESS  Felix Lloyd is a 69 y.o. male. HPI   Pt is here for f/u on his DM2, chol, htn, depression/anxiety, and hypogonadism. He is continuing to do well on meds but states he still has bad days where he cries often thinking about his wife and her passing 3 yrs ago. He denies thoughts of harming himself or anyone else and does not want to see a therapist at this time. He would like a flu shot and allergies are flaring up so he would like something for that as well. No Known Allergies    Current Outpatient Medications   Medication Sig    SITagliptin (JANUVIA) 25 mg tablet Take 1 Tab by mouth daily.  levocetirizine (XYZAL) 5 mg tablet Take 1 Tab by mouth daily.  allopurinol (ZYLOPRIM) 300 mg tablet Take 1 Tab by mouth daily. Indications: 1/2 tab three times a week    losartan-hydroCHLOROthiazide (HYZAAR) 100-12.5 mg per tablet TAKE 1 TABLET EVERY DAY    amLODIPine (NORVASC) 10 mg tablet TAKE 1 TABLET EVERY DAY    testosterone (ANDROGEL) 20.25 mg/1.25 gram (1.62 %) gel APPLY 2 PUMPS DAILY    citalopram (CELEXA) 10 mg tablet Take 1 Tab by mouth daily.  aspirin delayed-release 81 mg tablet Take  by mouth daily.  cholecalciferol (VITAMIN D3) 1,000 unit tablet Take  by mouth daily. No current facility-administered medications for this visit. Review of Systems   Constitutional: Negative. Negative for chills, fever and malaise/fatigue. HENT: Negative. Negative for congestion, ear pain, sore throat and tinnitus. Eyes: Negative. Negative for blurred vision, double vision and photophobia. Respiratory: Negative. Negative for cough, shortness of breath and wheezing. Cardiovascular: Negative. Negative for chest pain, palpitations and leg swelling. Gastrointestinal: Negative. Negative for abdominal pain, heartburn, nausea and vomiting. Genitourinary: Negative. Negative for dysuria, frequency, hematuria and urgency. Musculoskeletal: Negative.   Negative for back pain, joint pain, myalgias and neck pain. Skin: Negative. Negative for itching and rash. Neurological: Negative. Negative for dizziness, tingling, tremors and headaches. Psychiatric/Behavioral: Positive for depression (controlled on meds). Negative for memory loss. The patient is nervous/anxious (controlled on meds) and has insomnia (controlled on meds). Visit Vitals  /75   Pulse 76   Temp 97.9 °F (36.6 °C) (Oral)   Resp 18   Ht 5' 9\" (1.753 m)   SpO2 95%   BMI 31.90 kg/m²       Physical Exam   Constitutional: He is oriented to person, place, and time. He appears well-developed and well-nourished. No distress. HENT:   Head: Normocephalic and atraumatic. Cardiovascular: Normal rate, regular rhythm and normal heart sounds. Pulmonary/Chest: Effort normal and breath sounds normal.   Neurological: He is alert and oriented to person, place, and time. Skin: Skin is warm and dry. He is not diaphoretic. Psychiatric: He has a normal mood and affect. His behavior is normal.       ASSESSMENT and PLAN    ICD-10-CM ICD-9-CM    1. Controlled type 2 diabetes mellitus with diabetic polyneuropathy, without long-term current use of insulin (HCC) E11.42 250.60 SITagliptin (JANUVIA) 25 mg tablet     357.2    2. Chronic kidney disease, unspecified CKD stage N18.9 585.9 SITagliptin (JANUVIA) 25 mg tablet   3. Allergic rhinitis, unspecified seasonality, unspecified trigger J30.9 477.9 levocetirizine (XYZAL) 5 mg tablet   4. Chronic gout without tophus, unspecified cause, unspecified site M1A. 9XX0 274.02 allopurinol (ZYLOPRIM) 300 mg tablet   5. Essential hypertension I10 401.9 losartan-hydroCHLOROthiazide (HYZAAR) 100-12.5 mg per tablet      amLODIPine (NORVASC) 10 mg tablet   6. Testicular dysfunction E29.9 257.9 testosterone (ANDROGEL) 20.25 mg/1.25 gram (1.62 %) gel   7.  Encounter for immunization Z23 V03.89 ADMIN INFLUENZA VIRUS VAC      INFLUENZA VACCINE INACTIVATED (IIV), SUBUNIT, ADJUVANTED, IM CANCELED: INFLUENZA VACCINE INACTIVATED (IIV), SUBUNIT, ADJUVANTED, IM   8. Anxiety and depression F41.9 300.00 citalopram (CELEXA) 10 mg tablet    F32.9 311      Follow-up and Dispositions    · Return in about 6 months (around 5/5/2020) for anxiety/depression. Pt expressed understanding of visit summary and plans for any follow ups or referrals as well as any medications prescribed.

## 2020-05-06 ENCOUNTER — VIRTUAL VISIT (OUTPATIENT)
Dept: FAMILY MEDICINE CLINIC | Facility: CLINIC | Age: 75
End: 2020-05-06

## 2020-05-06 DIAGNOSIS — N18.9 CHRONIC KIDNEY DISEASE, UNSPECIFIED CKD STAGE: ICD-10-CM

## 2020-05-06 DIAGNOSIS — I10 ESSENTIAL HYPERTENSION: ICD-10-CM

## 2020-05-06 DIAGNOSIS — E11.42 CONTROLLED TYPE 2 DIABETES MELLITUS WITH DIABETIC POLYNEUROPATHY, WITHOUT LONG-TERM CURRENT USE OF INSULIN (HCC): ICD-10-CM

## 2020-05-06 DIAGNOSIS — E29.9 TESTICULAR DYSFUNCTION: ICD-10-CM

## 2020-05-06 DIAGNOSIS — F32.A ANXIETY AND DEPRESSION: ICD-10-CM

## 2020-05-06 DIAGNOSIS — Z12.5 PROSTATE CANCER SCREENING: ICD-10-CM

## 2020-05-06 DIAGNOSIS — E55.9 VITAMIN D DEFICIENCY: ICD-10-CM

## 2020-05-06 DIAGNOSIS — M1A.9XX0 CHRONIC GOUT WITHOUT TOPHUS, UNSPECIFIED CAUSE, UNSPECIFIED SITE: ICD-10-CM

## 2020-05-06 DIAGNOSIS — J30.9 ALLERGIC RHINITIS, UNSPECIFIED SEASONALITY, UNSPECIFIED TRIGGER: ICD-10-CM

## 2020-05-06 DIAGNOSIS — F41.9 ANXIETY AND DEPRESSION: ICD-10-CM

## 2020-05-06 DIAGNOSIS — F32.A DEPRESSION, UNSPECIFIED DEPRESSION TYPE: Primary | ICD-10-CM

## 2020-05-06 RX ORDER — CITALOPRAM 10 MG/1
TABLET ORAL
Qty: 90 TAB | Refills: 1 | Status: SHIPPED | OUTPATIENT
Start: 2020-05-06 | End: 2020-10-15

## 2020-05-06 RX ORDER — AMLODIPINE BESYLATE 10 MG/1
10 TABLET ORAL DAILY
Qty: 90 TAB | Refills: 1 | Status: SHIPPED | OUTPATIENT
Start: 2020-05-06 | End: 2020-11-10 | Stop reason: SDUPTHER

## 2020-05-06 RX ORDER — ALLOPURINOL 300 MG/1
150 TABLET ORAL 3 TIMES WEEKLY
Qty: 30 TAB | Refills: 1 | Status: SHIPPED | OUTPATIENT
Start: 2020-05-06 | End: 2020-11-23 | Stop reason: SDUPTHER

## 2020-05-06 RX ORDER — LEVOCETIRIZINE DIHYDROCHLORIDE 5 MG/1
5 TABLET, FILM COATED ORAL DAILY
Qty: 90 TAB | Refills: 1 | Status: SHIPPED | OUTPATIENT
Start: 2020-05-06 | End: 2020-11-23 | Stop reason: SDUPTHER

## 2020-05-06 RX ORDER — LOSARTAN POTASSIUM AND HYDROCHLOROTHIAZIDE 12.5; 1 MG/1; MG/1
1 TABLET ORAL DAILY
Qty: 90 TAB | Refills: 1 | Status: SHIPPED | OUTPATIENT
Start: 2020-05-06 | End: 2020-11-10 | Stop reason: SDUPTHER

## 2020-05-06 RX ORDER — TESTOSTERONE 20.25 MG/1.25G
GEL TOPICAL
Qty: 3 BOTTLE | Refills: 1 | Status: SHIPPED | OUTPATIENT
Start: 2020-05-06 | End: 2020-11-23 | Stop reason: SDUPTHER

## 2020-05-06 NOTE — PROGRESS NOTES
HISTORY OF PRESENT ILLNESS  Justice Wagoner is a 69 y.o. male. HPI   Pt is being seen via doxy. me for f/u on his depression, testosterone, DM2, htn, and gout. He has been doing well on meds and is due for labs so will come in next week for those. He has no complaints and is being safe and staying home as much as possible with all the COVID precautions in place. His depression is controlled on meds and he does not feel the stay at home precautions have worsened that. Overall he seems to be doing well. No Known Allergies    Current Outpatient Medications   Medication Sig    citalopram (CELEXA) 10 mg tablet TAKE 1 TABLET EVERY DAY    levocetirizine (XYZAL) 5 mg tablet Take 1 Tab by mouth daily.  allopurinoL (ZYLOPRIM) 300 mg tablet Take 0.5 Tabs by mouth Three (3) times a week. Indications: 1/2 tab three times a week    SITagliptin (Januvia) 25 mg tablet TAKE 1 TABLET EVERY DAY    losartan-hydroCHLOROthiazide (HYZAAR) 100-12.5 mg per tablet Take 1 Tab by mouth daily.  amLODIPine (NORVASC) 10 mg tablet Take 1 Tab by mouth daily.  testosterone (AndroGeL) 20.25 mg/1.25 gram (1.62 %) gel APPLY 2 PUMPS DAILY    aspirin delayed-release 81 mg tablet Take  by mouth daily.  cholecalciferol (VITAMIN D3) 1,000 unit tablet Take  by mouth daily. No current facility-administered medications for this visit. Review of Systems   Constitutional: Negative. Negative for chills, fever and malaise/fatigue. HENT: Negative. Negative for congestion, ear pain, sore throat and tinnitus. Eyes: Negative. Negative for blurred vision, double vision and photophobia. Respiratory: Negative. Negative for cough, shortness of breath and wheezing. Cardiovascular: Negative. Negative for chest pain, palpitations and leg swelling. Gastrointestinal: Negative. Negative for abdominal pain, heartburn, nausea and vomiting. Genitourinary: Negative. Negative for dysuria, frequency, hematuria and urgency. Musculoskeletal: Negative. Negative for back pain, joint pain, myalgias and neck pain. Skin: Negative. Negative for itching and rash. Neurological: Negative. Negative for dizziness, tingling, tremors and headaches. Psychiatric/Behavioral: Positive for depression (controlled on meds). Negative for memory loss. The patient is nervous/anxious (controlled on meds) and has insomnia (controlled on meds). Physical Exam  Constitutional:       General: He is not in acute distress. Appearance: Normal appearance. He is not ill-appearing. HENT:      Head: Normocephalic and atraumatic. Pulmonary:      Effort: No respiratory distress. Neurological:      Mental Status: He is alert and oriented to person, place, and time. Psychiatric:         Mood and Affect: Mood normal.         Behavior: Behavior normal.         Thought Content: Thought content normal.         Judgment: Judgment normal.         ASSESSMENT and PLAN    ICD-10-CM ICD-9-CM    1. Depression, unspecified depression type F32.9 311    2. Anxiety and depression F41.9 300.00 citalopram (CELEXA) 10 mg tablet    F32.9 311 CBC WITH AUTOMATED DIFF      VITAMIN B12   3. Allergic rhinitis, unspecified seasonality, unspecified trigger J30.9 477.9 levocetirizine (XYZAL) 5 mg tablet   4. Chronic gout without tophus, unspecified cause, unspecified site M1A. 9XX0 274.02 allopurinoL (ZYLOPRIM) 300 mg tablet      CBC WITH AUTOMATED DIFF   5. Controlled type 2 diabetes mellitus with diabetic polyneuropathy, without long-term current use of insulin ([facility]) E11.42 250.60 SITagliptin (Januvia) 25 mg tablet     223.1 METABOLIC PANEL, COMPREHENSIVE      LIPID PANEL      HEMOGLOBIN A1C WITH EAG      MICROALBUMIN, UR, RAND W/ MICROALB/CREAT RATIO   6. Chronic kidney disease, unspecified CKD stage N18.9 585.9 SITagliptin (Januvia) 25 mg tablet      METABOLIC PANEL, COMPREHENSIVE   7.  Essential hypertension I10 401.9 losartan-hydroCHLOROthiazide (HYZAAR) 100-12.5 mg per tablet      amLODIPine (NORVASC) 10 mg tablet      LIPID PANEL   8. Testicular dysfunction E29.9 257.9 testosterone (AndroGeL) 20.25 mg/1.25 gram (1.62 %) gel      TESTOSTERONE, FREE & TOTAL   9. Vitamin D deficiency E55.9 268.9 VITAMIN D, 25 HYDROXY   10. Prostate cancer screening Z12.5 V76.44 PSA SCREENING (SCREENING)     Follow-up and Dispositions    · Return in about 1 week (around 5/13/2020) for labs. Pt expressed understanding of visit summary and plans for any follow ups or referrals as well as any medications prescribed. Seen by synchronous (real-time) audio-video technology via doxy. me on 05/15/2020  . The patient is aware that this patient-initiated Telehealth encounter is a billable service, with coverage as determined by his or her insurance carrier. He/She is aware that they may receive a bill and has provided verbal consent to proceed: Yes        I was in the office while conducting this encounter.

## 2020-05-15 NOTE — PATIENT INSTRUCTIONS

## 2020-10-20 ENCOUNTER — TELEPHONE (OUTPATIENT)
Dept: FAMILY MEDICINE CLINIC | Age: 75
End: 2020-10-20

## 2020-10-20 NOTE — TELEPHONE ENCOUNTER
Patient is requesting Lab orders to be sent to [facility] [phone]. He have an appointment there on Thursday.

## 2020-10-22 ENCOUNTER — DOCUMENTATION ONLY (OUTPATIENT)
Dept: FAMILY MEDICINE CLINIC | Age: 75
End: 2020-10-22

## 2020-10-23 LAB
25(OH)D3 SERPL-MCNC: 33 NG/ML (ref 30–100)
ALB/GLOBRATIO, 58C: 1.5 (CALC) (ref 1–2.5)
ALBUMIN SERPL-MCNC: 4.4 G/DL (ref 3.6–5.1)
ALKALINE PHOSPHATASE, TOTAL, 25002000: 56 U/L (ref 35–144)
ALT SERPL-CCNC: 49 U/L (ref 9–46)
AST SERPL W P-5'-P-CCNC: 33 U/L (ref 10–35)
BASOPHILS # BLD: 47 CELLS/UL (ref 0–200)
BASOPHILS NFR BLD: 0.6 %
BILIRUB SERPL-MCNC: 0.9 MG/DL (ref 0.2–1.2)
BUN SERPL-MCNC: 29 MG/DL (ref 7–25)
BUN/CREATININE RATIO,BUCR: 18 (CALC) (ref 6–22)
CALCIUM SERPL-MCNC: 9.6 MG/DL (ref 8.6–10.3)
CHLORIDE SERPL-SCNC: 97 MMOL/L (ref 98–110)
CHOL/HDL RATIO,CHHDX: 3.9 (CALC)
CHOLEST SERPL-MCNC: 195 MG/DL
CO2 SERPL-SCNC: 26 MMOL/L (ref 20–32)
CREAT SERPL-MCNC: 1.62 MG/DL (ref 0.7–1.18)
EAG (MG/DL),9916804: 237 (CALC)
EAG (MMOL/L),9916805: 13.2 (CALC)
EOSINOPHIL # BLD: 164 CELLS/UL (ref 15–500)
EOSINOPHIL NFR BLD: 2.1 %
ERYTHROCYTE [DISTWIDTH] IN BLOOD BY AUTOMATED COUNT: 13.3 % (ref 11–15)
GLOBULIN,GLOB: 3 G/DL (CALC) (ref 1.9–3.7)
GLUCOSE SERPL-MCNC: 270 MG/DL (ref 65–99)
HBA1C MFR BLD HPLC: 9.9 % OF TOTAL HGB
HCT VFR BLD AUTO: 51.8 % (ref 38.5–50)
HDLC SERPL-MCNC: 50 MG/DL
HGB BLD-MCNC: 17.4 G/DL (ref 13.2–17.1)
LDL-CHOLESTEROL: 117 MG/DL (CALC)
LYMPHOCYTES # BLD: 1435 CELLS/UL (ref 850–3900)
LYMPHOCYTES NFR BLD: 18.4 %
MCH RBC QN AUTO: 29.2 PG (ref 27–33)
MCHC RBC AUTO-ENTMCNC: 33.6 G/DL (ref 32–36)
MCV RBC AUTO: 87.1 FL (ref 80–100)
MONOCYTES # BLD: 530 CELLS/UL (ref 200–950)
MONOCYTES NFR BLD: 6.8 %
NEUTROPHILS # BLD AUTO: 5624 CELLS/UL (ref 1500–7800)
NEUTROPHILS # BLD: 72.1 %
NON-HDL CHOLESTEROL, 011976: 145 MG/DL (CALC)
PLATELET # BLD AUTO: 216 THOUSAND/UL (ref 140–400)
PMV BLD AUTO: 10.5 FL (ref 7.5–12.5)
POTASSIUM SERPL-SCNC: 3.6 MMOL/L (ref 3.5–5.3)
PROT SERPL-MCNC: 7.4 G/DL (ref 6.1–8.1)
PSA TOTAL,4108: 1.5 NG/ML
RBC # BLD AUTO: 5.95 MILLION/UL (ref 4.2–5.8)
SODIUM SERPL-SCNC: 134 MMOL/L (ref 135–146)
TESTOSTERONE, TOTAL, MALES (ADULT): 241 NG/DL (ref 250–827)
TRIGL SERPL-MCNC: 168 MG/DL (ref ?–150)
VIT B12 SERPL-MCNC: 415 PG/ML (ref 200–1100)
WBC # BLD AUTO: 7.8 THOUSAND/UL (ref 3.8–10.8)

## 2020-11-09 DIAGNOSIS — I10 ESSENTIAL HYPERTENSION: ICD-10-CM

## 2020-11-10 RX ORDER — AMLODIPINE BESYLATE 10 MG/1
TABLET ORAL
Qty: 90 TAB | Refills: 1 | Status: SHIPPED | OUTPATIENT
Start: 2020-11-10 | End: 2020-11-23 | Stop reason: SDUPTHER

## 2020-11-10 RX ORDER — LOSARTAN POTASSIUM AND HYDROCHLOROTHIAZIDE 12.5; 1 MG/1; MG/1
TABLET ORAL
Qty: 90 TAB | Refills: 1 | Status: SHIPPED | OUTPATIENT
Start: 2020-11-10 | End: 2020-11-23 | Stop reason: SDUPTHER

## 2020-11-23 ENCOUNTER — VIRTUAL VISIT (OUTPATIENT)
Dept: FAMILY MEDICINE CLINIC | Age: 75
End: 2020-11-23
Payer: MEDICARE

## 2020-11-23 DIAGNOSIS — E11.42 CONTROLLED TYPE 2 DIABETES MELLITUS WITH DIABETIC POLYNEUROPATHY, WITHOUT LONG-TERM CURRENT USE OF INSULIN (HCC): ICD-10-CM

## 2020-11-23 DIAGNOSIS — E55.9 VITAMIN D DEFICIENCY: Primary | ICD-10-CM

## 2020-11-23 DIAGNOSIS — F41.9 ANXIETY AND DEPRESSION: ICD-10-CM

## 2020-11-23 DIAGNOSIS — N18.9 CHRONIC KIDNEY DISEASE, UNSPECIFIED CKD STAGE: ICD-10-CM

## 2020-11-23 DIAGNOSIS — F32.A ANXIETY AND DEPRESSION: ICD-10-CM

## 2020-11-23 DIAGNOSIS — I10 ESSENTIAL HYPERTENSION: ICD-10-CM

## 2020-11-23 DIAGNOSIS — J30.9 ALLERGIC RHINITIS, UNSPECIFIED SEASONALITY, UNSPECIFIED TRIGGER: ICD-10-CM

## 2020-11-23 DIAGNOSIS — E29.9 TESTICULAR DYSFUNCTION: ICD-10-CM

## 2020-11-23 DIAGNOSIS — Z00.00 ENCOUNTER FOR ANNUAL WELLNESS EXAM IN MEDICARE PATIENT: ICD-10-CM

## 2020-11-23 DIAGNOSIS — M1A.9XX0 CHRONIC GOUT WITHOUT TOPHUS, UNSPECIFIED CAUSE, UNSPECIFIED SITE: ICD-10-CM

## 2020-11-23 PROCEDURE — 3046F HEMOGLOBIN A1C LEVEL >9.0%: CPT | Performed by: PHYSICIAN ASSISTANT

## 2020-11-23 PROCEDURE — 2022F DILAT RTA XM EVC RTNOPTHY: CPT | Performed by: PHYSICIAN ASSISTANT

## 2020-11-23 PROCEDURE — 3017F COLORECTAL CA SCREEN DOC REV: CPT | Performed by: PHYSICIAN ASSISTANT

## 2020-11-23 PROCEDURE — G8421 BMI NOT CALCULATED: HCPCS | Performed by: PHYSICIAN ASSISTANT

## 2020-11-23 PROCEDURE — 99214 OFFICE O/P EST MOD 30 MIN: CPT | Performed by: PHYSICIAN ASSISTANT

## 2020-11-23 PROCEDURE — G8536 NO DOC ELDER MAL SCRN: HCPCS | Performed by: PHYSICIAN ASSISTANT

## 2020-11-23 PROCEDURE — G9717 DOC PT DX DEP/BP F/U NT REQ: HCPCS | Performed by: PHYSICIAN ASSISTANT

## 2020-11-23 PROCEDURE — G8427 DOCREV CUR MEDS BY ELIG CLIN: HCPCS | Performed by: PHYSICIAN ASSISTANT

## 2020-11-23 PROCEDURE — G0439 PPPS, SUBSEQ VISIT: HCPCS | Performed by: PHYSICIAN ASSISTANT

## 2020-11-23 PROCEDURE — 1101F PT FALLS ASSESS-DOCD LE1/YR: CPT | Performed by: PHYSICIAN ASSISTANT

## 2020-11-23 PROCEDURE — G8756 NO BP MEASURE DOC: HCPCS | Performed by: PHYSICIAN ASSISTANT

## 2020-11-23 RX ORDER — LEVOCETIRIZINE DIHYDROCHLORIDE 5 MG/1
5 TABLET, FILM COATED ORAL DAILY
Qty: 90 TAB | Refills: 1 | Status: SHIPPED | OUTPATIENT
Start: 2020-11-23 | End: 2021-11-30

## 2020-11-23 RX ORDER — CITALOPRAM 10 MG/1
10 TABLET ORAL DAILY
Qty: 90 TAB | Refills: 0 | Status: SHIPPED | OUTPATIENT
Start: 2020-11-23 | End: 2021-05-03

## 2020-11-23 RX ORDER — AMLODIPINE BESYLATE 10 MG/1
TABLET ORAL
Qty: 90 TAB | Refills: 0 | Status: SHIPPED | OUTPATIENT
Start: 2020-11-23 | End: 2021-08-11

## 2020-11-23 RX ORDER — ACETAMINOPHEN 500 MG
2000 TABLET ORAL DAILY
Qty: 90 CAP | Refills: 0 | Status: SHIPPED | OUTPATIENT
Start: 2020-11-23 | End: 2021-02-23

## 2020-11-23 RX ORDER — TESTOSTERONE 20.25 MG/1.25G
GEL TOPICAL
Qty: 3 BOTTLE | Refills: 1 | Status: SHIPPED | OUTPATIENT
Start: 2020-11-23 | End: 2021-09-23 | Stop reason: SDUPTHER

## 2020-11-23 RX ORDER — ALLOPURINOL 300 MG/1
150 TABLET ORAL 3 TIMES WEEKLY
Qty: 30 TAB | Refills: 1 | Status: SHIPPED | OUTPATIENT
Start: 2020-11-23 | End: 2021-11-30

## 2020-11-23 RX ORDER — LOSARTAN POTASSIUM AND HYDROCHLOROTHIAZIDE 12.5; 1 MG/1; MG/1
TABLET ORAL
Qty: 90 TAB | Refills: 0 | Status: SHIPPED | OUTPATIENT
Start: 2020-11-23 | End: 2021-04-26

## 2020-11-23 NOTE — PROGRESS NOTES
HISTORY OF PRESENT ILLNESS  Sienna Ritchie is a 70 y.o. male. HPI   [facility] and to review recent labs as well as med refills. His chol and A1c were elevated from previous. Pt states he had to go to [place] unexpectedly to care for his daughter who had a retinal detachment and that while there he was not able to eat well or exercise like he usu does and did gain almost 20lbs. He would like to try getting back to normal and bring it down on his own before starting meds. Will recheck in 3 mo and if not better will change meds. Pt is agreeable to this. No Known Allergies    Current Outpatient Medications   Medication Sig    levocetirizine (XYZAL) 5 mg tablet Take 1 Tab by mouth daily.  citalopram (CELEXA) 10 mg tablet Take 1 Tab by mouth daily.  allopurinoL (ZYLOPRIM) 300 mg tablet Take 0.5 Tabs by mouth Three (3) times a week. Indications: 1/2 tab three times a week    SITagliptin (Januvia) 25 mg tablet TAKE 1 TABLET EVERY DAY    amLODIPine (NORVASC) 10 mg tablet TAKE 1 TABLET EVERY DAY    losartan-hydroCHLOROthiazide (HYZAAR) 100-12.5 mg per tablet TAKE 1 TABLET EVERY DAY.  testosterone (AndroGeL) 20.25 mg/1.25 gram (1.62 %) gel APPLY 2 PUMPS DAILY    cholecalciferol (VITAMIN D3) (2,000 UNITS /50 MCG) cap capsule Take 2,000 Units by mouth daily.  aspirin delayed-release 81 mg tablet Take  by mouth daily. No current facility-administered medications for this visit. Review of Systems   Constitutional: Negative. Negative for chills, fever and malaise/fatigue. HENT: Negative. Negative for congestion, ear pain, sore throat and tinnitus. Eyes: Negative. Negative for blurred vision, double vision and photophobia. Respiratory: Negative. Negative for cough, shortness of breath and wheezing. Cardiovascular: Negative. Negative for chest pain, palpitations and leg swelling. Gastrointestinal: Negative.   Negative for abdominal pain, heartburn, nausea and vomiting. Genitourinary: Negative. Negative for dysuria, frequency, hematuria and urgency. Musculoskeletal: Negative. Negative for back pain, joint pain, myalgias and neck pain. Skin: Negative. Negative for itching and rash. Neurological: Negative. Negative for dizziness, tingling, tremors and headaches. Psychiatric/Behavioral: Positive for depression (controlled on meds). Negative for memory loss. The patient is nervous/anxious (controlled on meds) and has insomnia (controlled on meds). Physical Exam  Constitutional:       General: He is not in acute distress. Appearance: Normal appearance. He is not ill-appearing. HENT:      Head: Normocephalic and atraumatic. Pulmonary:      Effort: No respiratory distress. Neurological:      Mental Status: He is alert and oriented to person, place, and time. Psychiatric:         Mood and Affect: Mood normal.         Behavior: Behavior normal.         Thought Content: Thought content normal.         Judgment: Judgment normal.         ASSESSMENT and PLAN    ICD-10-CM ICD-9-CM    1. Vitamin D deficiency  E55.9 268.9 cholecalciferol (VITAMIN D3) (2,000 UNITS /50 MCG) cap capsule   2. Allergic rhinitis, unspecified seasonality, unspecified trigger  J30.9 477.9 levocetirizine (XYZAL) 5 mg tablet   3. Anxiety and depression  F41.9 300.00 citalopram (CELEXA) 10 mg tablet    F32.9 311    4. Chronic gout without tophus, unspecified cause, unspecified site  M1A. 9XX0 274.02 allopurinoL (ZYLOPRIM) 300 mg tablet   5. Controlled type 2 diabetes mellitus with diabetic polyneuropathy, without long-term current use of insulin (HCC)  E11.42 250.60 SITagliptin (Januvia) 25 mg tablet     357.2    6. Chronic kidney disease, unspecified CKD stage  N18.9 585.9 SITagliptin (Januvia) 25 mg tablet   7. Essential hypertension  I10 401.9 amLODIPine (NORVASC) 10 mg tablet      losartan-hydroCHLOROthiazide (HYZAAR) 100-12.5 mg per tablet   8.  Testicular dysfunction  E29.9 257.9 testosterone (AndroGeL) 20.25 mg/1.25 gram (1.62 %) gel   9. Encounter for annual wellness exam in Medicare patient  Z00.00 V70.0        Pt expressed understanding of visit summary and plans for any follow ups or referrals as well as any medications prescribed. Seen by synchronous (real-time) audio-video technology via doxy. me on 11/23/2020    The patient is aware that this patient-initiated Telehealth encounter is a billable service, with coverage as determined by his or her insurance carrier. He/She is aware that they may receive a bill and has provided verbal consent to proceed: Yes        I was in the office while conducting this encounter. Medicare Wellness Visit  Aleyda Pond is a 70 y.o. male and presents for annual Medicare Wellness Visit. Problem List: Reviewed with patient and discussed risk factors. Patient Active Problem List   Diagnosis Code    Controlled type 2 diabetes mellitus with diabetic polyneuropathy, without long-term current use of insulin ([facility] [address] E11.42    Essential hypertension I10    Idiopathic gout M10.00    CKD (chronic kidney disease) N18.9    Reactive depression F32.9    Primary insomnia F51.01    History of rectal cancer Z85.048    Controlled type 2 diabetes mellitus without complication, without long-term current use of insulin (HCC) E11.9    Depression F32.9    Chronic gout without tophus M1A. 9XX0    Testicular dysfunction E29.9    Type 2 diabetes mellitus with nephropathy (HCC) E11.21    Allergic rhinitis J30.9    Vitamin D deficiency A05.4    Periumbilical hernia V59.6       Current medical providers:  Patient Care Team:  Grant Moore PA-C as PCP - General (Physician Assistant)  Grant Moore PA-C as PCP - [facility] Empaneled Provider    PSH: Reviewed with patient  Past Surgical History:   Procedure Laterality Date    ABDOMEN SURGERY PROC UNLISTED      HX COLONOSCOPY          SH: Reviewed with patient  Social History     Tobacco Use    Smoking status: Never Smoker    Smokeless tobacco: Never Used   Substance Use Topics    Alcohol use: No    Drug use: Not on file       FH: Reviewed with patient  Family History   Problem Relation Age of Onset   Yamel Langton Cancer Mother     No Known Problems Father        Medications/Allergies: Reviewed with patient  Current Outpatient Medications on File Prior to Visit   Medication Sig Dispense Refill    [DISCONTINUED] losartan-hydroCHLOROthiazide (HYZAAR) 100-12.5 mg per tablet TAKE 1 TABLET EVERY DAY. 90 Tab 1    [DISCONTINUED] amLODIPine (NORVASC) 10 mg tablet TAKE 1 TABLET EVERY DAY 90 Tab 1    [DISCONTINUED] citalopram (CELEXA) 10 mg tablet TAKE 1 TABLET EVERY DAY 90 Tab 0    [DISCONTINUED] levocetirizine (XYZAL) 5 mg tablet Take 1 Tab by mouth daily. 90 Tab 1    [DISCONTINUED] allopurinoL (ZYLOPRIM) 300 mg tablet Take 0.5 Tabs by mouth Three (3) times a week. Indications: 1/2 tab three times a week 30 Tab 1    [DISCONTINUED] SITagliptin (Januvia) 25 mg tablet TAKE 1 TABLET EVERY DAY 90 Tab 1    [DISCONTINUED] testosterone (AndroGeL) 20.25 mg/1.25 gram (1.62 %) gel APPLY 2 PUMPS DAILY 3 Bottle 1    aspirin delayed-release 81 mg tablet Take  by mouth daily.  [DISCONTINUED] cholecalciferol (VITAMIN D3) 1,000 unit tablet Take  by mouth daily. No current facility-administered medications on file prior to visit. No Known Allergies    Objective: There were no vitals taken for this visit. There is no height or weight on file to calculate BMI. Assessment of cognitive impairment: Alert and oriented x 3    Depression Screen:   3 most recent PHQ Screens 4/21/2017   Little interest or pleasure in doing things Not at all   Feeling down, depressed, irritable, or hopeless Not at all   Total Score PHQ 2 0       Fall Risk Assessment:    Fall Risk Assessment, last 12 mths 11/23/2020   Able to walk? Yes   Fall in past 12 months?  No       Functional Ability:   Does the patient exhibit a steady gait? yes   How long did it take the patient to get up and walk from a sitting position? <10sec   Is the patient self reliant?  (ie can do own laundry, meals, household chores)  yes     Does the patient handle his/her own medications? yes     Does the patient handle his/her own money? yes     Is the patients home safe (ie good lighting, handrails on stairs and bath, etc.)? yes     Did you notice or did patient express any hearing difficulties? no     Did you notice or did patient express any vision difficulties?   no     Were distance and reading eye charts used? No - VV and pt sees ophthalmology       Advance Care Planning:   Patient was offered the opportunity to discuss advance care planning:  yes     Does patient have an Advance Directive:  no   If no, did you provide information on Caring Connections? yes       Plan:      Orders Placed This Encounter    levocetirizine (XYZAL) 5 mg tablet    citalopram (CELEXA) 10 mg tablet    allopurinoL (ZYLOPRIM) 300 mg tablet    SITagliptin (Januvia) 25 mg tablet    amLODIPine (NORVASC) 10 mg tablet    losartan-hydroCHLOROthiazide (HYZAAR) 100-12.5 mg per tablet    testosterone (AndroGeL) 20.25 mg/1.25 gram (1.62 %) gel    cholecalciferol (VITAMIN D3) (2,000 UNITS /50 MCG) cap capsule       Health Maintenance   Topic Date Due    Shingrix Vaccine Age 49> (1 of 2) 08/09/1997    Foot Exam Q1  04/29/2020    MICROALBUMIN Q1  04/29/2020    A1C test (Diabetic or Prediabetic)  01/22/2021    Eye Exam Retinal or Dilated  07/01/2021    Colorectal Cancer Screening Combo  07/13/2021    GLAUCOMA SCREENING Q2Y  07/23/2021    Lipid Screen  10/22/2021    Medicare Yearly Exam  11/24/2021    DTaP/Tdap/Td series (2 - Td) 04/21/2027    Hepatitis C Screening  Completed    Flu Vaccine  Completed    Pneumococcal 65+ years  Completed       *Patient verbalized understanding and agreement with the plan.   A copy of the After Visit Summary with personalized health plan was given to the patient today.

## 2020-11-23 NOTE — PATIENT INSTRUCTIONS
Schedule of Personalized Health Plan (Provide Copy to Patient) The best way to stay healthy is to live a healthy lifestyle. A healthy lifestyle includes regular exercise, eating a well-balanced diet, keeping a healthy weight and not smoking. Regular physical exams and screening tests are another important way to take care of yourself. Preventive exams provided by health care providers can find health problems early when treatment works best and can keep you from getting certain diseases or illnesses. Preventive services include exams, lab tests, screenings, shots, monitoring and information to help you take care of your own health. All people over 65 should have a pneumonia shot. Pneumonia shots are usually only needed once in a lifetime unless your doctor decides differently. All people over 65 should have a yearly flu shot. People over 65 are at medium to high risk for Hepatitis B. Three shots are needed for complete protection. In addition to your physical exam, some screening tests are recommended: 
 
Bone mass measurement (dexa scan) is recommended every two years if you have certain risk factors, such as personal history of vertebral fracture or chronic steroid medication use Diabetes Mellitus screening is recommended every year. Glaucoma is an eye disease caused by high pressure in the eye. An eye exam is recommended every year. Cardiovascular screening tests that check your cholesterol and other blood fat (lipid) levels are recommended every five years. Colorectal Cancer screening tests help to find pre-cancerous polyps (growths in the colon) so they can be removed before they turn into cancer. Tests ordered for screening depend on your personal and family history risk factors. Screening for Prostate Cancer is recommended yearly with a digital rectal exam and/or a PSA test 
 
Here is a list of your current Health Maintenance items with a due date: 
Health Maintenance Topic Date Due  Shingrix Vaccine Age 50> (1 of 2) 08/09/1997  Foot Exam Q1  04/29/2020  MICROALBUMIN Q1  04/29/2020  A1C test (Diabetic or Prediabetic)  01/22/2021  Eye Exam Retinal or Dilated  07/01/2021  Colorectal Cancer Screening Combo  07/13/2021  GLAUCOMA SCREENING Q2Y  07/23/2021  Lipid Screen  10/22/2021  Medicare Yearly Exam  11/24/2021  
 DTaP/Tdap/Td series (2 - Td) 04/21/2027  Hepatitis C Screening  Completed  Flu Vaccine  Completed  Pneumococcal 65+ years  Completed

## 2021-02-19 DIAGNOSIS — N18.9 CHRONIC KIDNEY DISEASE, UNSPECIFIED CKD STAGE: ICD-10-CM

## 2021-02-19 DIAGNOSIS — E11.42 CONTROLLED TYPE 2 DIABETES MELLITUS WITH DIABETIC POLYNEUROPATHY, WITHOUT LONG-TERM CURRENT USE OF INSULIN (HCC): ICD-10-CM

## 2021-02-19 DIAGNOSIS — E55.9 VITAMIN D DEFICIENCY: ICD-10-CM

## 2021-02-23 RX ORDER — ACETAMINOPHEN 500 MG
TABLET ORAL
Qty: 90 CAP | Refills: 0 | Status: SHIPPED | OUTPATIENT
Start: 2021-02-23 | End: 2021-05-11

## 2021-03-05 ENCOUNTER — OFFICE VISIT (OUTPATIENT)
Dept: FAMILY MEDICINE CLINIC | Age: 76
End: 2021-03-05
Payer: MEDICARE

## 2021-03-05 ENCOUNTER — HOSPITAL ENCOUNTER (OUTPATIENT)
Dept: LAB | Age: 76
Discharge: HOME | End: 2021-03-05
Payer: MEDICARE

## 2021-03-05 DIAGNOSIS — E55.9 VITAMIN D DEFICIENCY: ICD-10-CM

## 2021-03-05 DIAGNOSIS — F32.A ANXIETY AND DEPRESSION: ICD-10-CM

## 2021-03-05 DIAGNOSIS — M1A.9XX0 CHRONIC GOUT WITHOUT TOPHUS, UNSPECIFIED CAUSE, UNSPECIFIED SITE: ICD-10-CM

## 2021-03-05 DIAGNOSIS — I10 ESSENTIAL HYPERTENSION: ICD-10-CM

## 2021-03-05 DIAGNOSIS — E11.42 CONTROLLED TYPE 2 DIABETES MELLITUS WITH DIABETIC POLYNEUROPATHY, WITHOUT LONG-TERM CURRENT USE OF INSULIN (HCC): Primary | ICD-10-CM

## 2021-03-05 DIAGNOSIS — F41.9 ANXIETY AND DEPRESSION: ICD-10-CM

## 2021-03-05 DIAGNOSIS — E29.1 HYPOGONADISM MALE: ICD-10-CM

## 2021-03-05 DIAGNOSIS — E29.9 TESTICULAR DYSFUNCTION: ICD-10-CM

## 2021-03-05 DIAGNOSIS — E11.42 CONTROLLED TYPE 2 DIABETES MELLITUS WITH DIABETIC POLYNEUROPATHY, WITHOUT LONG-TERM CURRENT USE OF INSULIN (HCC): ICD-10-CM

## 2021-03-05 LAB
25(OH)D3 SERPL-MCNC: 26.7 NG/ML (ref 30–100)
ALBUMIN SERPL-MCNC: 4.1 G/DL (ref 3.4–5)
ALBUMIN/GLOB SERPL: 1.1 {RATIO} (ref 0.8–1.7)
ALP SERPL-CCNC: 66 U/L (ref 45–117)
ALT SERPL-CCNC: 73 U/L (ref 16–61)
ANION GAP SERPL CALC-SCNC: 7 MMOL/L (ref 3–18)
APPEARANCE UR: CLEAR
AST SERPL-CCNC: 39 U/L (ref 10–38)
BACTERIA URNS QL MICRO: NEGATIVE /HPF
BASOPHILS # BLD: 0 K/UL (ref 0–0.1)
BASOPHILS NFR BLD: 1 % (ref 0–2)
BILIRUB SERPL-MCNC: 1.1 MG/DL (ref 0.2–1)
BILIRUB UR QL: NEGATIVE
BUN SERPL-MCNC: 27 MG/DL (ref 7–18)
BUN/CREAT SERPL: 15 (ref 12–20)
CALCIUM SERPL-MCNC: 9.5 MG/DL (ref 8.5–10.1)
CHLORIDE SERPL-SCNC: 103 MMOL/L (ref 100–111)
CHOLEST SERPL-MCNC: 177 MG/DL
CO2 SERPL-SCNC: 31 MMOL/L (ref 21–32)
COLOR UR: YELLOW
CREAT SERPL-MCNC: 1.78 MG/DL (ref 0.6–1.3)
DIFFERENTIAL METHOD BLD: ABNORMAL
EOSINOPHIL # BLD: 0.2 K/UL (ref 0–0.4)
EOSINOPHIL NFR BLD: 2 % (ref 0–5)
EPITH CASTS URNS QL MICRO: NEGATIVE /LPF (ref 0–5)
ERYTHROCYTE [DISTWIDTH] IN BLOOD BY AUTOMATED COUNT: 13.5 % (ref 11.6–14.5)
EST. AVERAGE GLUCOSE BLD GHB EST-MCNC: 246 MG/DL
GLOBULIN SER CALC-MCNC: 3.6 G/DL (ref 2–4)
GLUCOSE SERPL-MCNC: 269 MG/DL (ref 74–99)
GLUCOSE UR STRIP.AUTO-MCNC: 500 MG/DL
HBA1C MFR BLD: 10.2 % (ref 4.2–5.6)
HCT VFR BLD AUTO: 50.5 % (ref 36–48)
HDLC SERPL-MCNC: 51 MG/DL (ref 40–60)
HDLC SERPL: 3.5 {RATIO} (ref 0–5)
HGB BLD-MCNC: 17.2 G/DL (ref 13–16)
HGB UR QL STRIP: NEGATIVE
KETONES UR QL STRIP.AUTO: NEGATIVE MG/DL
LDLC SERPL CALC-MCNC: 88 MG/DL (ref 0–100)
LEUKOCYTE ESTERASE UR QL STRIP.AUTO: NEGATIVE
LIPID PROFILE,FLP: ABNORMAL
LYMPHOCYTES # BLD: 2.4 K/UL (ref 0.9–3.6)
LYMPHOCYTES NFR BLD: 28 % (ref 21–52)
MCH RBC QN AUTO: 29.6 PG (ref 24–34)
MCHC RBC AUTO-ENTMCNC: 34.1 G/DL (ref 31–37)
MCV RBC AUTO: 86.8 FL (ref 74–97)
MONOCYTES # BLD: 0.1 K/UL (ref 0.05–1.2)
MONOCYTES NFR BLD: 1 % (ref 3–10)
NEUTS SEG # BLD: 6 K/UL (ref 1.8–8)
NEUTS SEG NFR BLD: 68 % (ref 40–73)
NITRITE UR QL STRIP.AUTO: NEGATIVE
PH UR STRIP: 5.5 [PH] (ref 5–8)
PLATELET # BLD AUTO: 193 K/UL (ref 135–420)
PMV BLD AUTO: 10.6 FL (ref 9.2–11.8)
POTASSIUM SERPL-SCNC: 5.1 MMOL/L (ref 3.5–5.5)
PROT SERPL-MCNC: 7.7 G/DL (ref 6.4–8.2)
PROT UR STRIP-MCNC: 100 MG/DL
RBC # BLD AUTO: 5.82 M/UL (ref 4.7–5.5)
RBC #/AREA URNS HPF: NEGATIVE /HPF (ref 0–5)
SODIUM SERPL-SCNC: 141 MMOL/L (ref 136–145)
SP GR UR REFRACTOMETRY: 1.02 (ref 1–1.03)
TRIGL SERPL-MCNC: 190 MG/DL (ref ?–150)
TSH SERPL DL<=0.05 MIU/L-ACNC: 2.28 UIU/ML (ref 0.36–3.74)
URATE SERPL-MCNC: 4.4 MG/DL (ref 2.6–7.2)
UROBILINOGEN UR QL STRIP.AUTO: 0.2 EU/DL (ref 0.2–1)
VLDLC SERPL CALC-MCNC: 38 MG/DL
WBC # BLD AUTO: 8.7 K/UL (ref 4.6–13.2)
WBC URNS QL MICRO: NEGATIVE /HPF (ref 0–4)

## 2021-03-05 PROCEDURE — G8752 SYS BP LESS 140: HCPCS | Performed by: PHYSICIAN ASSISTANT

## 2021-03-05 PROCEDURE — G8754 DIAS BP LESS 90: HCPCS | Performed by: PHYSICIAN ASSISTANT

## 2021-03-05 PROCEDURE — 84403 ASSAY OF TOTAL TESTOSTERONE: CPT

## 2021-03-05 PROCEDURE — G8417 CALC BMI ABV UP PARAM F/U: HCPCS | Performed by: PHYSICIAN ASSISTANT

## 2021-03-05 PROCEDURE — 85025 COMPLETE CBC W/AUTO DIFF WBC: CPT

## 2021-03-05 PROCEDURE — 83036 HEMOGLOBIN GLYCOSYLATED A1C: CPT

## 2021-03-05 PROCEDURE — 3046F HEMOGLOBIN A1C LEVEL >9.0%: CPT | Performed by: PHYSICIAN ASSISTANT

## 2021-03-05 PROCEDURE — 84550 ASSAY OF BLOOD/URIC ACID: CPT

## 2021-03-05 PROCEDURE — 3017F COLORECTAL CA SCREEN DOC REV: CPT | Performed by: PHYSICIAN ASSISTANT

## 2021-03-05 PROCEDURE — G9717 DOC PT DX DEP/BP F/U NT REQ: HCPCS | Performed by: PHYSICIAN ASSISTANT

## 2021-03-05 PROCEDURE — 82306 VITAMIN D 25 HYDROXY: CPT

## 2021-03-05 PROCEDURE — 2022F DILAT RTA XM EVC RTNOPTHY: CPT | Performed by: PHYSICIAN ASSISTANT

## 2021-03-05 PROCEDURE — 1101F PT FALLS ASSESS-DOCD LE1/YR: CPT | Performed by: PHYSICIAN ASSISTANT

## 2021-03-05 PROCEDURE — 36415 COLL VENOUS BLD VENIPUNCTURE: CPT

## 2021-03-05 PROCEDURE — 81001 URINALYSIS AUTO W/SCOPE: CPT

## 2021-03-05 PROCEDURE — G8427 DOCREV CUR MEDS BY ELIG CLIN: HCPCS | Performed by: PHYSICIAN ASSISTANT

## 2021-03-05 PROCEDURE — G8536 NO DOC ELDER MAL SCRN: HCPCS | Performed by: PHYSICIAN ASSISTANT

## 2021-03-05 PROCEDURE — 84443 ASSAY THYROID STIM HORMONE: CPT

## 2021-03-05 PROCEDURE — 80061 LIPID PANEL: CPT

## 2021-03-05 PROCEDURE — 99214 OFFICE O/P EST MOD 30 MIN: CPT | Performed by: PHYSICIAN ASSISTANT

## 2021-03-05 PROCEDURE — 80053 COMPREHEN METABOLIC PANEL: CPT

## 2021-03-11 LAB
COMMENT, TESC2: ABNORMAL
TESTOST FREE SERPL-MCNC: 6 PG/ML (ref 6.6–18.1)
TESTOST SERPL-MCNC: 284 NG/DL (ref 264–916)

## 2021-03-20 VITALS
WEIGHT: 217 LBS | BODY MASS INDEX: 32.14 KG/M2 | DIASTOLIC BLOOD PRESSURE: 81 MMHG | HEIGHT: 69 IN | SYSTOLIC BLOOD PRESSURE: 138 MMHG | HEART RATE: 80 BPM | RESPIRATION RATE: 17 BRPM | TEMPERATURE: 97.9 F | OXYGEN SATURATION: 95 %

## 2021-03-20 NOTE — PATIENT INSTRUCTIONS

## 2021-03-20 NOTE — PROGRESS NOTES
HISTORY OF PRESENT ILLNESS  Lyric Villareal is a 70 y.o. male. HPI   Pt is being seen for follow up on his DM2, htn, depression, gout, and testosterone. He is due for labs and is fasting today. He has not gotten his COVID vaccine yet but does want it. He has plans to travel to [place] next week to visit his daughter which he is looking forward bc he has felt more down lately with having to isolate. He has no concerns or complaints today. No Known Allergies    Current Outpatient Medications   Medication Sig    SITagliptin (Januvia) 25 mg tablet TAKE 1 TABLET EVERY DAY    cholecalciferol (VITAMIN D3) (2,000 UNITS /50 MCG) cap capsule TAKE 1 CAPSULE EVERY DAY    levocetirizine (XYZAL) 5 mg tablet Take 1 Tab by mouth daily.  citalopram (CELEXA) 10 mg tablet Take 1 Tab by mouth daily.  allopurinoL (ZYLOPRIM) 300 mg tablet Take 0.5 Tabs by mouth Three (3) times a week. Indications: 1/2 tab three times a week    amLODIPine (NORVASC) 10 mg tablet TAKE 1 TABLET EVERY DAY    losartan-hydroCHLOROthiazide (HYZAAR) 100-12.5 mg per tablet TAKE 1 TABLET EVERY DAY.  testosterone (AndroGeL) 20.25 mg/1.25 gram (1.62 %) gel APPLY 2 PUMPS DAILY    aspirin delayed-release 81 mg tablet Take  by mouth daily. No current facility-administered medications for this visit. Review of Systems   Constitutional: Negative. Negative for chills, fever and malaise/fatigue. HENT: Negative. Negative for congestion, ear pain, sore throat and tinnitus. Eyes: Negative. Negative for blurred vision, double vision and photophobia. Respiratory: Negative. Negative for cough, shortness of breath and wheezing. Cardiovascular: Negative. Negative for chest pain, palpitations and leg swelling. Gastrointestinal: Negative. Negative for abdominal pain, heartburn, nausea and vomiting. Genitourinary: Negative. Negative for dysuria, frequency, hematuria and urgency. Musculoskeletal: Negative.   Negative for back pain, joint pain, myalgias and neck pain. Skin: Negative. Negative for itching and rash. Neurological: Negative. Negative for dizziness, tingling, tremors and headaches. Psychiatric/Behavioral: Positive for depression (controlled on meds). Negative for memory loss. The patient is nervous/anxious (controlled on meds) and has insomnia (controlled on meds). Visit Vitals  /81   Pulse 80   Temp 97.9 °F (36.6 °C) (Temporal)   Resp 17   Ht 5' 9\" (1.753 m)   Wt 217 lb (98.4 kg)   SpO2 95%   BMI 32.05 kg/m²       Physical Exam  Constitutional:       General: He is not in acute distress. Appearance: Normal appearance. He is well-developed. He is not ill-appearing or diaphoretic. HENT:      Head: Normocephalic and atraumatic. Right Ear: Tympanic membrane normal.      Left Ear: Tympanic membrane normal.      Nose: Nose normal.   Cardiovascular:      Rate and Rhythm: Normal rate and regular rhythm. Heart sounds: Normal heart sounds. Pulmonary:      Effort: Pulmonary effort is normal.      Breath sounds: Normal breath sounds. Skin:     General: Skin is warm and dry. Neurological:      General: No focal deficit present. Mental Status: He is alert and oriented to person, place, and time. Psychiatric:         Mood and Affect: Mood normal.         Behavior: Behavior normal.         Thought Content: Thought content normal.         Judgment: Judgment normal.         ASSESSMENT and PLAN    ICD-10-CM ICD-9-CM    1. Controlled type 2 diabetes mellitus with diabetic polyneuropathy, without long-term current use of insulin ([facility])  H69.74 572.22 METABOLIC PANEL, COMPREHENSIVE     357.2 LIPID PANEL      HEMOGLOBIN A1C WITH EAG   2. Vitamin D deficiency  E55.9 268.9 VITAMIN D, 25 HYDROXY   3. Essential hypertension  G48 942.2 METABOLIC PANEL, COMPREHENSIVE      LIPID PANEL      URINALYSIS W/ RFLX MICROSCOPIC   4. Chronic gout without tophus, unspecified cause, unspecified site  M1A. 9XX0 274.02 CBC WITH AUTOMATED DIFF      URIC ACID   5. Anxiety and depression  F41.9 300.00 TSH 3RD GENERATION    F32.9 311    6. Hypogonadism male  E29.1 257.2 TESTOSTERONE, FREE & TOTAL   7. Testicular dysfunction  E29.9 257.9      Follow-up and Dispositions    · Return in about 3 months (around 6/5/2021) for follow up, diabetes. Pt expressed understanding of visit summary and plans for any follow ups or referrals as well as any medications prescribed.

## 2021-04-16 ENCOUNTER — TELEPHONE (OUTPATIENT)
Dept: FAMILY MEDICINE CLINIC | Age: 76
End: 2021-04-16

## 2021-04-16 DIAGNOSIS — E11.65 UNCONTROLLED TYPE 2 DIABETES MELLITUS WITH HYPERGLYCEMIA (HCC): ICD-10-CM

## 2021-04-16 DIAGNOSIS — E11.42 CONTROLLED TYPE 2 DIABETES MELLITUS WITH DIABETIC POLYNEUROPATHY, WITHOUT LONG-TERM CURRENT USE OF INSULIN (HCC): Primary | ICD-10-CM

## 2021-04-16 DIAGNOSIS — N18.9 CHRONIC KIDNEY DISEASE, UNSPECIFIED CKD STAGE: ICD-10-CM

## 2021-04-16 RX ORDER — DULAGLUTIDE 0.75 MG/.5ML
0.75 INJECTION, SOLUTION SUBCUTANEOUS
Qty: 12 SYRINGE | Refills: 0 | Status: SHIPPED | OUTPATIENT
Start: 2021-04-16 | End: 2021-04-27 | Stop reason: SDUPTHER

## 2021-04-16 RX ORDER — DULAGLUTIDE 0.75 MG/.5ML
0.75 INJECTION, SOLUTION SUBCUTANEOUS
Qty: 12 SYRINGE | Refills: 0 | Status: SHIPPED | OUTPATIENT
Start: 2021-04-16 | End: 2021-04-16 | Stop reason: SDUPTHER

## 2021-04-16 NOTE — TELEPHONE ENCOUNTER
PLease advise pt his A1c is up to 10.2 and I would like to start him on Trulicity, a once weekly injectable and he can stop Januvia.  We should recheck A1c in 3 mo

## 2021-04-16 NOTE — TELEPHONE ENCOUNTER
Please advise pt his A1c is up to 10.2 and I would like to start him on trulicity, a once weekly injectable to help bring it down and he can stop [place] [facility] [place]

## 2021-04-21 ENCOUNTER — IMMUNIZATION (OUTPATIENT)
Dept: INTERNAL MEDICINE CLINIC | Age: 76
End: 2021-04-21
Payer: MEDICARE

## 2021-04-21 ENCOUNTER — DOCUMENTATION ONLY (OUTPATIENT)
Dept: INTERNAL MEDICINE CLINIC | Age: 76
End: 2021-04-21

## 2021-04-21 DIAGNOSIS — Z23 ENCOUNTER FOR IMMUNIZATION: Primary | ICD-10-CM

## 2021-04-21 PROCEDURE — 91301 COVID-19, MRNA, LNP-S, PF, 100MCG/0.5ML DOSE(MODERNA): CPT | Performed by: INTERNAL MEDICINE

## 2021-04-21 PROCEDURE — 0011A COVID-19, MRNA, LNP-S, PF, 100MCG/0.5ML DOSE(MODERNA): CPT | Performed by: INTERNAL MEDICINE

## 2021-04-21 NOTE — PROGRESS NOTES
Alex Monahan is a 70 y.o. male who presents for   Chief Complaint   Patient presents with    Immunization/Injection     1st covid 23 moderna vaccine   ,  He denies any symptoms , reactions or allergies that would exclude them from being immunized today. Risks and adverse reactions were discussed and the VIS was given to them. All questions were addressed. He was observed for 15 min post injection. There were no reactions observed.     George Cordoba LPN

## 2021-04-21 NOTE — PROGRESS NOTES
Rayna Padgett is a 70 y.o. male who presents for   Chief Complaint   Patient presents with    Immunization/Injection     1st moderna vaccine given   ,  He denies any symptoms , reactions or allergies that would exclude them from being immunized today. Risks and adverse reactions were discussed and the VIS was given to them. All questions were addressed. He was observed for 15 min post injection. There were no reactions observed.     Jessica Chopra LPN

## 2021-04-23 DIAGNOSIS — I10 ESSENTIAL HYPERTENSION: ICD-10-CM

## 2021-04-26 RX ORDER — LOSARTAN POTASSIUM AND HYDROCHLOROTHIAZIDE 12.5; 1 MG/1; MG/1
TABLET ORAL
Qty: 90 TAB | Refills: 0 | Status: SHIPPED | OUTPATIENT
Start: 2021-04-26 | End: 2021-07-24

## 2021-04-27 ENCOUNTER — TELEPHONE (OUTPATIENT)
Dept: FAMILY MEDICINE CLINIC | Age: 76
End: 2021-04-27

## 2021-04-27 DIAGNOSIS — E11.65 UNCONTROLLED TYPE 2 DIABETES MELLITUS WITH HYPERGLYCEMIA (HCC): ICD-10-CM

## 2021-04-27 DIAGNOSIS — N18.9 CHRONIC KIDNEY DISEASE, UNSPECIFIED CKD STAGE: ICD-10-CM

## 2021-04-27 NOTE — TELEPHONE ENCOUNTER
Requested Prescriptions     Pending Prescriptions Disp Refills    dulaglutide (Trulicity) 9.25 FL/9.9 mL sub-q pen 12 Syringe 0     Si.5 mL by SubCUTAneous route every seven (7) days.      Future Appointments   Date Time Provider Chloe Luther   2021  9:00 AM MA COVID VACCINE 28 DAY [facility] BS AMB   2021  9:00 AM Anne Lorenzo PA-C [facility] BS AMB

## 2021-05-03 DIAGNOSIS — F32.A ANXIETY AND DEPRESSION: ICD-10-CM

## 2021-05-03 DIAGNOSIS — F41.9 ANXIETY AND DEPRESSION: ICD-10-CM

## 2021-05-03 RX ORDER — DULAGLUTIDE 0.75 MG/.5ML
0.75 INJECTION, SOLUTION SUBCUTANEOUS
Qty: 12 SYRINGE | Refills: 0 | Status: SHIPPED | OUTPATIENT
Start: 2021-05-03 | End: 2021-10-01

## 2021-05-03 RX ORDER — CITALOPRAM 10 MG/1
TABLET ORAL
Qty: 90 TAB | Refills: 0 | Status: SHIPPED | OUTPATIENT
Start: 2021-05-03 | End: 2021-07-20 | Stop reason: DRUGHIGH

## 2021-05-11 DIAGNOSIS — E55.9 VITAMIN D DEFICIENCY: ICD-10-CM

## 2021-05-11 RX ORDER — NICOTINE 11MG/24HR
PATCH, TRANSDERMAL 24 HOURS TRANSDERMAL
Qty: 90 CAP | Refills: 0 | Status: SHIPPED | OUTPATIENT
Start: 2021-05-11 | End: 2021-08-11

## 2021-05-19 ENCOUNTER — DOCUMENTATION ONLY (OUTPATIENT)
Dept: INTERNAL MEDICINE CLINIC | Age: 76
End: 2021-05-19

## 2021-05-19 ENCOUNTER — IMMUNIZATION (OUTPATIENT)
Dept: INTERNAL MEDICINE CLINIC | Age: 76
End: 2021-05-19
Payer: MEDICARE

## 2021-05-19 DIAGNOSIS — Z23 ENCOUNTER FOR IMMUNIZATION: Primary | ICD-10-CM

## 2021-05-19 PROCEDURE — 0012A COVID-19, MRNA, LNP-S, PF, 100MCG/0.5ML DOSE(MODERNA): CPT | Performed by: INTERNAL MEDICINE

## 2021-05-19 PROCEDURE — 91301 COVID-19, MRNA, LNP-S, PF, 100MCG/0.5ML DOSE(MODERNA): CPT | Performed by: INTERNAL MEDICINE

## 2021-05-19 NOTE — PROGRESS NOTES
Pavithra Goldman is a 70 y.o. male who presents for   Chief Complaint   Patient presents with    Immunization/Injection     2nd covid 23 moderna vaccine given   ,  He denies any symptoms , reactions or allergies that would exclude them from being immunized today. Risks and adverse reactions were discussed and the VIS was given to them. All questions were addressed. He was observed for 15 min post injection. There were no reactions observed.     Dianelys Avery LPN

## 2021-07-20 ENCOUNTER — OFFICE VISIT (OUTPATIENT)
Dept: FAMILY MEDICINE CLINIC | Age: 76
End: 2021-07-20
Payer: MEDICARE

## 2021-07-20 ENCOUNTER — HOSPITAL ENCOUNTER (OUTPATIENT)
Dept: LAB | Age: 76
Discharge: HOME | End: 2021-07-20
Payer: MEDICARE

## 2021-07-20 VITALS
TEMPERATURE: 97 F | RESPIRATION RATE: 16 BRPM | HEIGHT: 69 IN | BODY MASS INDEX: 32.02 KG/M2 | WEIGHT: 216.2 LBS | SYSTOLIC BLOOD PRESSURE: 136 MMHG | OXYGEN SATURATION: 96 % | HEART RATE: 79 BPM | DIASTOLIC BLOOD PRESSURE: 86 MMHG

## 2021-07-20 DIAGNOSIS — R53.81 MALAISE: ICD-10-CM

## 2021-07-20 DIAGNOSIS — I10 ESSENTIAL HYPERTENSION: ICD-10-CM

## 2021-07-20 DIAGNOSIS — F41.9 ANXIETY AND DEPRESSION: ICD-10-CM

## 2021-07-20 DIAGNOSIS — F32.A ANXIETY AND DEPRESSION: ICD-10-CM

## 2021-07-20 DIAGNOSIS — Z12.83 SKIN CANCER SCREENING: ICD-10-CM

## 2021-07-20 DIAGNOSIS — E11.65 UNCONTROLLED TYPE 2 DIABETES MELLITUS WITH HYPERGLYCEMIA (HCC): ICD-10-CM

## 2021-07-20 DIAGNOSIS — L98.9 CHANGING SKIN LESION: ICD-10-CM

## 2021-07-20 DIAGNOSIS — E11.65 UNCONTROLLED TYPE 2 DIABETES MELLITUS WITH HYPERGLYCEMIA (HCC): Primary | ICD-10-CM

## 2021-07-20 DIAGNOSIS — E55.9 VITAMIN D DEFICIENCY: ICD-10-CM

## 2021-07-20 DIAGNOSIS — E29.1 HYPOGONADISM MALE: ICD-10-CM

## 2021-07-20 DIAGNOSIS — F32.A DEPRESSION, UNSPECIFIED DEPRESSION TYPE: ICD-10-CM

## 2021-07-20 LAB
25(OH)D3 SERPL-MCNC: 28.7 NG/ML (ref 30–100)
ALBUMIN SERPL-MCNC: 4.3 G/DL (ref 3.4–5)
ALBUMIN/GLOB SERPL: 1.2 {RATIO} (ref 0.8–1.7)
ALP SERPL-CCNC: 69 U/L (ref 45–117)
ALT SERPL-CCNC: 86 U/L (ref 16–61)
ANION GAP SERPL CALC-SCNC: 7 MMOL/L (ref 3–18)
APPEARANCE UR: CLEAR
AST SERPL-CCNC: 55 U/L (ref 10–38)
BACTERIA URNS QL MICRO: NEGATIVE /HPF
BASOPHILS # BLD: 0.1 K/UL (ref 0–0.1)
BASOPHILS NFR BLD: 1 % (ref 0–2)
BILIRUB SERPL-MCNC: 1.4 MG/DL (ref 0.2–1)
BILIRUB UR QL: NEGATIVE
BUN SERPL-MCNC: 24 MG/DL (ref 7–18)
BUN/CREAT SERPL: 14 (ref 12–20)
CALCIUM SERPL-MCNC: 9.7 MG/DL (ref 8.5–10.1)
CHLORIDE SERPL-SCNC: 100 MMOL/L (ref 100–111)
CHOLEST SERPL-MCNC: 208 MG/DL
CO2 SERPL-SCNC: 29 MMOL/L (ref 21–32)
COLOR UR: YELLOW
CREAT SERPL-MCNC: 1.75 MG/DL (ref 0.6–1.3)
DIFFERENTIAL METHOD BLD: ABNORMAL
EOSINOPHIL # BLD: 0.2 K/UL (ref 0–0.4)
EOSINOPHIL NFR BLD: 2 % (ref 0–5)
EPITH CASTS URNS QL MICRO: NORMAL /LPF (ref 0–5)
ERYTHROCYTE [DISTWIDTH] IN BLOOD BY AUTOMATED COUNT: 13.2 % (ref 11.6–14.5)
EST. AVERAGE GLUCOSE BLD GHB EST-MCNC: 209 MG/DL
GLOBULIN SER CALC-MCNC: 3.7 G/DL (ref 2–4)
GLUCOSE SERPL-MCNC: 203 MG/DL (ref 74–99)
GLUCOSE UR STRIP.AUTO-MCNC: NEGATIVE MG/DL
HBA1C MFR BLD: 8.9 % (ref 4.2–5.6)
HCT VFR BLD AUTO: 51.7 % (ref 36–48)
HDLC SERPL-MCNC: 51 MG/DL (ref 40–60)
HDLC SERPL: 4.1 {RATIO} (ref 0–5)
HGB BLD-MCNC: 17.7 G/DL (ref 13–16)
HGB UR QL STRIP: NEGATIVE
KETONES UR QL STRIP.AUTO: NEGATIVE MG/DL
LDLC SERPL CALC-MCNC: 108.2 MG/DL (ref 0–100)
LEUKOCYTE ESTERASE UR QL STRIP.AUTO: NEGATIVE
LIPID PROFILE,FLP: ABNORMAL
LYMPHOCYTES # BLD: 1.9 K/UL (ref 0.9–3.6)
LYMPHOCYTES NFR BLD: 19 % (ref 21–52)
MCH RBC QN AUTO: 29.5 PG (ref 24–34)
MCHC RBC AUTO-ENTMCNC: 34.2 G/DL (ref 31–37)
MCV RBC AUTO: 86.2 FL (ref 74–97)
MONOCYTES # BLD: 0.7 K/UL (ref 0.05–1.2)
MONOCYTES NFR BLD: 7 % (ref 3–10)
NEUTS SEG # BLD: 7.2 K/UL (ref 1.8–8)
NEUTS SEG NFR BLD: 72 % (ref 40–73)
NITRITE UR QL STRIP.AUTO: NEGATIVE
PH UR STRIP: 6 [PH] (ref 5–8)
PLATELET # BLD AUTO: 231 K/UL (ref 135–420)
PMV BLD AUTO: 10.2 FL (ref 9.2–11.8)
POTASSIUM SERPL-SCNC: 3.9 MMOL/L (ref 3.5–5.5)
PROT SERPL-MCNC: 8 G/DL (ref 6.4–8.2)
PROT UR STRIP-MCNC: 100 MG/DL
RBC # BLD AUTO: 6 M/UL (ref 4.35–5.65)
RBC #/AREA URNS HPF: NEGATIVE /HPF (ref 0–5)
SODIUM SERPL-SCNC: 136 MMOL/L (ref 136–145)
SP GR UR REFRACTOMETRY: 1.02 (ref 1–1.03)
TRIGL SERPL-MCNC: 244 MG/DL (ref ?–150)
TSH SERPL DL<=0.05 MIU/L-ACNC: 1.5 UIU/ML (ref 0.36–3.74)
UROBILINOGEN UR QL STRIP.AUTO: 0.2 EU/DL (ref 0.2–1)
VLDLC SERPL CALC-MCNC: 48.8 MG/DL
WBC # BLD AUTO: 10 K/UL (ref 4.6–13.2)
WBC URNS QL MICRO: NORMAL /HPF (ref 0–5)

## 2021-07-20 PROCEDURE — G8536 NO DOC ELDER MAL SCRN: HCPCS | Performed by: PHYSICIAN ASSISTANT

## 2021-07-20 PROCEDURE — 84443 ASSAY THYROID STIM HORMONE: CPT

## 2021-07-20 PROCEDURE — 3017F COLORECTAL CA SCREEN DOC REV: CPT | Performed by: PHYSICIAN ASSISTANT

## 2021-07-20 PROCEDURE — G8417 CALC BMI ABV UP PARAM F/U: HCPCS | Performed by: PHYSICIAN ASSISTANT

## 2021-07-20 PROCEDURE — 99214 OFFICE O/P EST MOD 30 MIN: CPT | Performed by: PHYSICIAN ASSISTANT

## 2021-07-20 PROCEDURE — 2022F DILAT RTA XM EVC RTNOPTHY: CPT | Performed by: PHYSICIAN ASSISTANT

## 2021-07-20 PROCEDURE — G8427 DOCREV CUR MEDS BY ELIG CLIN: HCPCS | Performed by: PHYSICIAN ASSISTANT

## 2021-07-20 PROCEDURE — 85025 COMPLETE CBC W/AUTO DIFF WBC: CPT

## 2021-07-20 PROCEDURE — 80061 LIPID PANEL: CPT

## 2021-07-20 PROCEDURE — 81001 URINALYSIS AUTO W/SCOPE: CPT

## 2021-07-20 PROCEDURE — 3046F HEMOGLOBIN A1C LEVEL >9.0%: CPT | Performed by: PHYSICIAN ASSISTANT

## 2021-07-20 PROCEDURE — 83036 HEMOGLOBIN GLYCOSYLATED A1C: CPT

## 2021-07-20 PROCEDURE — 1101F PT FALLS ASSESS-DOCD LE1/YR: CPT | Performed by: PHYSICIAN ASSISTANT

## 2021-07-20 PROCEDURE — 82306 VITAMIN D 25 HYDROXY: CPT

## 2021-07-20 PROCEDURE — G8752 SYS BP LESS 140: HCPCS | Performed by: PHYSICIAN ASSISTANT

## 2021-07-20 PROCEDURE — 80053 COMPREHEN METABOLIC PANEL: CPT

## 2021-07-20 PROCEDURE — G8754 DIAS BP LESS 90: HCPCS | Performed by: PHYSICIAN ASSISTANT

## 2021-07-20 PROCEDURE — G9717 DOC PT DX DEP/BP F/U NT REQ: HCPCS | Performed by: PHYSICIAN ASSISTANT

## 2021-07-20 RX ORDER — CITALOPRAM 20 MG/1
20 TABLET, FILM COATED ORAL DAILY
Qty: 90 TABLET | Refills: 1 | Status: SHIPPED | OUTPATIENT
Start: 2021-07-20 | End: 2021-12-28 | Stop reason: SDUPTHER

## 2021-07-20 NOTE — PROGRESS NOTES
HISTORY OF PRESENT ILLNESS  Naomi Benitez is a 70 y.o. male. HPI  Pt is being seen for f/u on his DM2, chol, testosterone, and htn. He is feeling more depressed lately bc last mo was his wife, Sheyla mendez and his anniversary is coming up and then it is the anniversary of her passing. He would like to try increasing his celexa. He has a hard time being home alone and has not been able to travel as much bc of [employer]. He has a cruise scheduled in Aug but it was cancelled He is fasting for labs. He also has a spot behind his ear that his massage therapist pointed out to him and advised him it could be concerning and is often a spot in [employer]'s that cancer appears. No Known Allergies    Current Outpatient Medications   Medication Sig    citalopram (CELEXA) 20 mg tablet Take 1 Tablet by mouth daily.  Vitamin D3 50 mcg (2,000 unit) cap capsule TAKE 1 CAPSULE EVERY DAY    dulaglutide (Trulicity) 5.41 VR/0.4 mL sub-q pen 0.5 mL by SubCUTAneous route every seven (7) days.  losartan-hydroCHLOROthiazide (HYZAAR) 100-12.5 mg per tablet TAKE 1 TABLET EVERY DAY    levocetirizine (XYZAL) 5 mg tablet Take 1 Tab by mouth daily.  allopurinoL (ZYLOPRIM) 300 mg tablet Take 0.5 Tabs by mouth Three (3) times a week. Indications: 1/2 tab three times a week    amLODIPine (NORVASC) 10 mg tablet TAKE 1 TABLET EVERY DAY    testosterone (AndroGeL) 20.25 mg/1.25 gram (1.62 %) gel APPLY 2 PUMPS DAILY    aspirin delayed-release 81 mg tablet Take  by mouth daily. No current facility-administered medications for this visit. Review of Systems   Constitutional: Negative. Negative for chills, fever and malaise/fatigue. HENT: Negative. Negative for congestion, ear pain, sore throat and tinnitus. Eyes: Negative. Negative for blurred vision, double vision and photophobia. Respiratory: Negative. Negative for cough, shortness of breath and wheezing. Cardiovascular: Negative.   Negative for chest pain, palpitations and leg swelling. Gastrointestinal: Negative. Negative for abdominal pain, heartburn, nausea and vomiting. Genitourinary: Negative. Negative for dysuria, frequency, hematuria and urgency. Musculoskeletal: Negative. Negative for back pain, joint pain, myalgias and neck pain. Skin: Negative. Negative for itching and rash. Neurological: Negative. Negative for dizziness, tingling, tremors and headaches. Psychiatric/Behavioral: Positive for depression (controlled on meds). Negative for memory loss. The patient is nervous/anxious (controlled on meds) and has insomnia (controlled on meds). Visit Vitals  /86 (BP 1 Location: Left upper arm, BP Patient Position: Sitting)   Pulse 79   Temp 97 °F (36.1 °C) (Temporal)   Resp 16   Ht 5' 9\" (1.753 m)   Wt 216 lb 3.2 oz (98.1 kg)   SpO2 96%   BMI 31.93 kg/m²       Physical Exam  Constitutional:       General: He is not in acute distress. Appearance: Normal appearance. He is well-developed. He is not ill-appearing or diaphoretic. HENT:      Head: Normocephalic and atraumatic. Right Ear: Tympanic membrane normal.      Left Ear: Tympanic membrane normal.      Nose: Nose normal.   Cardiovascular:      Rate and Rhythm: Normal rate and regular rhythm. Heart sounds: Normal heart sounds. Pulmonary:      Effort: Pulmonary effort is normal.      Breath sounds: Normal breath sounds. Skin:     General: Skin is warm and dry. Comments: Small nevus behind left ear - no concerning signs such as bleeding, crusting, or inflammation   Neurological:      General: No focal deficit present. Mental Status: He is alert and oriented to person, place, and time. Psychiatric:         Mood and Affect: Mood normal.         Behavior: Behavior normal.         Thought Content:  Thought content normal.         Judgment: Judgment normal.         ASSESSMENT and PLAN    ICD-10-CM ICD-9-CM    1. Vitamin D deficiency  E55.9 268.9 VITAMIN D, 25 HYDROXY   2. Uncontrolled type 2 diabetes mellitus with hyperglycemia (HCC)  O03.00 422.90 METABOLIC PANEL, COMPREHENSIVE      LIPID PANEL      HEMOGLOBIN A1C WITH EAG   3. Essential hypertension  I10 401.9 LIPID PANEL   4. Hypogonadism male  E29.1 257.2    5. Anxiety and depression  F41.9 300.00 TSH 3RD GENERATION    F32.9 311    6. Depression, unspecified depression type  F32.9 311 citalopram (CELEXA) 20 mg tablet   7. Malaise  R53.81 780.79 TSH 3RD GENERATION      CBC WITH AUTOMATED DIFF      URINALYSIS W/ RFLX MICROSCOPIC   8. Skin cancer screening  Z12.83 V76.43 REFERRAL TO DERMATOLOGY   9. Changing skin lesion  L98.9 709.9 REFERRAL TO DERMATOLOGY     Follow-up and Dispositions    · Return in about 4 months (around 11/20/2021) for follow up. Pt expressed understanding of visit summary and plans for any follow ups or referrals as well as any medications prescribed.

## 2021-07-20 NOTE — PATIENT INSTRUCTIONS
Learning About Type 2 Diabetes  What is type 2 diabetes? Type 2 diabetes is a condition in which you have too much sugar (glucose) in your blood. Glucose is a type of sugar produced in your body when carbohydrates and other foods are digested. It provides energy to cells throughout the body. Normally, blood sugar levels increase after you eat a meal. When blood sugar rises, cells in the pancreas release insulin, which causes the body to absorb sugar from the blood and lowers the blood sugar level to normal.  When you have type 2 diabetes, sugar stays in the blood rather than entering the body's cells to be used for energy. This results in high blood sugar. It happens when your body can't use insulin the right way. Over time, high blood sugar can harm many parts of the body, such as your eyes, heart, blood vessels, nerves, and kidneys. It can also increase your risk for other health problems (complications). What can you expect with type 2 diabetes? William Seth keep hearing about how important it is to keep your blood sugar within a target range. That's because over time, high blood sugar can lead to serious problems. It can:  · Harm your eyes, nerves, and kidneys. · Damage your blood vessels, leading to heart disease and stroke. · Reduce blood flow and cause nerve damage to parts of your body, especially your feet. This can cause slow healing and pain when you walk. · Make your immune system weak and less able to fight infections. When people hear the word \"diabetes,\" they often think of problems like these. But daily care and treatment can help prevent or delay these problems. The goal is to keep your blood sugar in a target range. That's the best way to reduce your chance of having more problems from diabetes. What are the symptoms? Some people who have type 2 diabetes may not have any symptoms early on.  Many people with the disease don't even know they have it at first. But with time, diabetes starts to cause symptoms. You have most symptoms of type 2 diabetes when your blood sugar is either too high or too low. The most common symptoms of high blood sugar include:  · Thirst.  · Needing to urinate often. · Weight loss. · Blurry vision. The symptoms of low blood sugar include:  · Sweating. · Shakiness. · Weakness. · Hunger. · Confusion. You're not likely to get symptoms of low blood sugar unless you take insulin or use certain diabetes medicines that lower blood sugar. How can you help prevent type 2 diabetes? There are things you can do to help prevent type 2 diabetes. Stay at a healthy weight. Exercise regularly, and eat healthy foods. Even small changes can make a difference. If you have prediabetes, the medicine metformin can help prevent type 2 diabetes. How is type 2 diabetes treated? Treatment for type 2 diabetes will change over time to meet your needs. But the focus of your treatment will usually be to keep your blood sugar levels in your target range. This will help prevent problems such as eye, kidney, heart, blood vessel, and nerve disease. Some people may need medicines to help their bodies make insulin or decrease insulin resistance. Some medicines slow down how quickly the body absorbs carbohydrates. Treatment to manage type 2 diabetes includes:  · Making healthy food choices and being active. · Losing weight, if you need to. · Seeing your doctor regularly. · Keeping your blood sugar in your target range. · Taking medicines, if you need them. · Quitting smoking, if you smoke. · Keeping your blood pressure and cholesterol under control. Follow-up care is a key part of your treatment and safety. Be sure to make and go to all appointments, and call your doctor if you are having problems. It's also a good idea to know your test results and keep a list of the medicines you take. Where can you learn more?   Go to http://www.gray.com/  Enter [id number] in the search box to learn more about \"Learning About Type 2 Diabetes. \"  Current as of: August 31, 2020               Content Version: 12.8  © 2006-2021 Healthwise[employer] Inc[employer]. Care instructions adapted under license by [employer] (which disclaims liability or warranty for this information). If you have questions about a medical condition or this instruction, always ask your healthcare professional. [place] [address] any warranty or liability for your use of this information.

## 2021-07-20 NOTE — PROGRESS NOTES
Carmen Friedman is a 70 y.o.  male presents today for office visit for follow up. Pt would also like to discuss DM. Pt is  fasting. Pt is in Room # 6      1. Have you been to the ER, urgent care clinic since your last visit? Hospitalized since your last visit? No    2. Have you seen or consulted any other health care providers outside of the [address] [place] [address] since your last visit? Include any pap smears or colon screening. No    Upcoming Appts  none    Health Maintenance reviewed     VORB: No orders of the defined types were placed in this encounter.   BERKLEY Burrell-Susanna Patel LPN

## 2021-07-24 DIAGNOSIS — I10 ESSENTIAL HYPERTENSION: ICD-10-CM

## 2021-07-24 RX ORDER — LOSARTAN POTASSIUM AND HYDROCHLOROTHIAZIDE 12.5; 1 MG/1; MG/1
TABLET ORAL
Qty: 90 TABLET | Refills: 0 | Status: SHIPPED | OUTPATIENT
Start: 2021-07-24 | End: 2021-10-26

## 2021-08-03 PROBLEM — F32.A DEPRESSION: Status: RESOLVED | Noted: 2017-07-25 | Resolved: 2021-08-03

## 2021-08-07 DIAGNOSIS — E55.9 VITAMIN D DEFICIENCY: ICD-10-CM

## 2021-08-07 DIAGNOSIS — I10 ESSENTIAL HYPERTENSION: ICD-10-CM

## 2021-08-11 RX ORDER — ACETAMINOPHEN 500 MG
TABLET ORAL
Qty: 90 CAPSULE | Refills: 0 | Status: SHIPPED | OUTPATIENT
Start: 2021-08-11 | End: 2021-11-30

## 2021-08-11 RX ORDER — AMLODIPINE BESYLATE 10 MG/1
TABLET ORAL
Qty: 90 TABLET | Refills: 0 | Status: SHIPPED | OUTPATIENT
Start: 2021-08-11 | End: 2021-11-30

## 2021-08-23 ENCOUNTER — TELEPHONE (OUTPATIENT)
Dept: FAMILY MEDICINE CLINIC | Age: 76
End: 2021-08-23

## 2021-09-23 DIAGNOSIS — E29.9 TESTICULAR DYSFUNCTION: ICD-10-CM

## 2021-09-25 DIAGNOSIS — N18.9 CHRONIC KIDNEY DISEASE, UNSPECIFIED CKD STAGE: ICD-10-CM

## 2021-09-25 DIAGNOSIS — E11.65 UNCONTROLLED TYPE 2 DIABETES MELLITUS WITH HYPERGLYCEMIA (HCC): ICD-10-CM

## 2021-09-30 RX ORDER — TESTOSTERONE 20.25 MG/1.25G
GEL TOPICAL
Qty: 3 EACH | Refills: 0 | Status: SHIPPED | OUTPATIENT
Start: 2021-09-30 | End: 2021-10-21 | Stop reason: SDUPTHER

## 2021-10-01 RX ORDER — DULAGLUTIDE 0.75 MG/.5ML
INJECTION, SOLUTION SUBCUTANEOUS
Qty: 12 EACH | Refills: 2 | Status: SHIPPED | OUTPATIENT
Start: 2021-10-01 | End: 2021-12-08

## 2021-10-15 ENCOUNTER — TELEPHONE (OUTPATIENT)
Dept: FAMILY MEDICINE CLINIC | Age: 76
End: 2021-10-15

## 2021-10-15 NOTE — TELEPHONE ENCOUNTER
Patient called in stating that he received a letter from [facility] notifying him that prescriber notification is needed before they can refill his testosterone and to call them at [phone].

## 2021-10-21 DIAGNOSIS — I10 ESSENTIAL HYPERTENSION: ICD-10-CM

## 2021-10-21 DIAGNOSIS — E29.9 TESTICULAR DYSFUNCTION: ICD-10-CM

## 2021-10-26 RX ORDER — TESTOSTERONE 20.25 MG/1.25G
GEL TOPICAL
Qty: 3 EACH | Refills: 0 | Status: SHIPPED | OUTPATIENT
Start: 2021-10-26 | End: 2022-01-05 | Stop reason: SDUPTHER

## 2021-10-26 RX ORDER — LOSARTAN POTASSIUM AND HYDROCHLOROTHIAZIDE 12.5; 1 MG/1; MG/1
TABLET ORAL
Qty: 90 TABLET | Refills: 0 | Status: SHIPPED | OUTPATIENT
Start: 2021-10-26 | End: 2021-12-13

## 2021-11-09 ENCOUNTER — TELEPHONE (OUTPATIENT)
Dept: PHARMACY | Age: 76
End: 2021-11-09

## 2021-11-09 NOTE — TELEPHONE ENCOUNTER
Ernestina Paget, [place] - would patient benefit from high intensity statin therapy?  - Patient would likely benefit from statin therapy, such as atorvastatin 40mg daily due to 10-year risk >40%, PMH of DM and HTN, and LDL>100    Please let me know if I can further assist or if you would like me to try to reach out to patient prior to next visit 11/19     Thank you,  Jaymie Yu, PharmD  [address]  Department toll free: [phone], option 2    ==============================================================  POP[facility]: STATIN THERAPY REVIEW  Identified statin use in persons with diabetes care gap [place] [facility] [place]: [place]24[place]. Last Visit: 7/20/2021    Patient also appears to be prescribed: HTN (ARB), DM    Patient not found in Outcomes MTM    ASSESSMENT  ACE/ARB ADHERENCE  Per Insurance Records through 10/24/2021 ([facility] = 98%)  Losartan-HCTZ last filled on 10/22/2021 for 90 day supply. Next refill due: 1/21/2022    BP Readings from Last 3 Encounters:   07/20/21 136/86   03/05/21 138/81   11/05/19 128/75     Estimated Creatinine Clearance: 42.8 mL/min (A) (by C-G formula based on SCr of 1.75 mg/dL (H)). DIABETES ADHERENCE  Per Insurance Records through [facility]24/2021 ([facility]   Trulicity last filled on 9/30/2021 for 84 day supply.  Next refill due: 12/23/2021    Lab Results   Component Value Date/Time    Hemoglobin A1c 8.9 (H) 07/20/2021 05:27 PM    Hemoglobin A1c 10.2 (H) 03/05/2021 10:17 AM    Hemoglobin A1c 9.9 (H) 10/22/2020 08:49 AM     NOTE A1c <9%    STATIN GAP IDENTIFIED    Per chart review, patient was not ever prescribed a statin    Lab Results   Component Value Date/Time    Cholesterol, total 208 (H) 07/20/2021 05:27 PM    HDL Cholesterol 51 07/20/2021 05:27 PM    LDL-CHOLESTEROL 117 (H) 10/22/2020 08:49 AM    LDL, calculated 108.2 (H) 07/20/2021 05:27 PM    VLDL, calculated 48.8 07/20/2021 05:27 PM    Triglyceride 244 (H) 07/20/2021 05:27 PM    CHOL/HDL Ratio 4.1 07/20/2021 05:27 PM    Cholesterol/HDL ratio 3.9 10/22/2020 08:49 AM     ALT (SGPT)   Date Value Ref Range Status   07/20/2021 86 (H) 16 - 61 U/L Final     AST (SGOT)   Date Value Ref Range Status   07/20/2021 55 (H) 10 - 38 U/L Final     The 10-year ASCVD risk score (Lizbet Terry, et al., 2013) is: 49.1%    Values used to calculate the score:      Age: 78 years      Sex: Male      Is Non- : No      Diabetic: Yes      Tobacco smoker: No      Systolic Blood Pressure: 864 mmHg      Is BP treated: Yes      HDL Cholesterol: 51 MG/DL      Total Cholesterol: 208 MG/DL     Hyperlipidemia Goal: Patient has a 10-yr ASCVD risk of 49.1% with DM, HTN, LDL>100 and is likely a candidate for high-intensity statin therapy based on updated guidelines. 2019 ADA Guidelines Age: 78  >/= 36years old:   o 10-year ASCVD risk >20% - statin is recommended. PLAN  The following are interventions that have been identified:  - Patient identified as having SUPD gap  - Will reach out to provider to consider initiating statin therapy (e.g. atorvastatin 40mg) for this patient  - Lab/followup per provider discretion    No patient out reach planned at this time.     Future Appointments   Date Time Provider Chloe Luther   11/19/2021  9:00 AM Andrew Lorenzo PA-C [facility] AMB Lorel Richards, PharmD  [facility] PGY1 Pharmacy Resident  [facility]  Department toll free: [phone], option 2 [de-identified] : 63-year-old female bilateral knee pain for several years gradually worsening right greater than left. Her pain is worse with walking radiates of the calf into the foot. She's had injections for many years the pain is worsening. She denies numbness and tingling. She underwent gel injections in Oct 2019 with mild temporary relief.  She may be interested in joint replacement in the future.

## 2021-11-09 NOTE — TELEPHONE ENCOUNTER
Gill Khan PA-C - would patient benefit from high intensity statin therapy?  - Patient would likely benefit from statin therapy, such as atorvastatin 40mg daily due to 10-year risk >40%, PMH of DM and HTN, and LDL>100. Of note, noticed LFTs slightly elevated at last visit (7/20/2021) - monitor LFTs as you find appropriate    Please let me know if I can further assist or if you would like me to try to reach out to patient prior to his next visit 11/19      Thank you! Nevaeh Nick, PharmD  [facility] PGY1 Pharmacy Resident  [facility]  Department toll free: [phone], option 2     ==============================================================  POPULATION HEALTH CLINICAL PHARMACY: STATIN THERAPY REVIEW  Identified statin use in persons with diabetes care gap per [facility] Records dated: 10/24/2021.     Last Visit: 7/20/2021     Patient also appears to be prescribed: HTN (ARB), DM     Patient not found in Outcomes MTM     ASSESSMENT  ACE/ARB ADHERENCE  Per Insurance Records through 10/24/2021 ([facility] = 98%)  Losartan-HCTZ last filled on 10/22/2021 for 90 day supply. Next refill due: 1/21/2022         BP Readings from Last 3 Encounters:   07/20/21 136/86   03/05/21 138/81   11/05/19 128/75      Estimated Creatinine Clearance: 42.8 mL/min (A) (by C-G formula based on SCr of 1.75 mg/dL (H)).     DIABETES ADHERENCE  Per Insurance Records through 10/24/2021 ([facility] = 585%)   Trulicity last filled on 9/30/2021 for 84 day supply.  Next refill due: 12/23/2021           Lab Results   Component Value Date/Time     Hemoglobin A1c 8.9 (H) 07/20/2021 05:27 PM     Hemoglobin A1c 10.2 (H) 03/05/2021 10:17 AM     Hemoglobin A1c 9.9 (H) 10/22/2020 08:49 AM      NOTE A1c <9%     STATIN GAP IDENTIFIED     Per chart review, patient was not ever prescribed a statin           Lab Results   Component Value Date/Time     Cholesterol, total 208 (H) 07/20/2021 05:27 PM     HDL Cholesterol 51 07/20/2021 05:27 PM     LDL-CHOLESTEROL 117 (H) 10/22/2020 08:49 AM     LDL, calculated 108.2 (H) 07/20/2021 05:27 PM     VLDL, calculated 48.8 07/20/2021 05:27 PM     Triglyceride 244 (H) 07/20/2021 05:27 PM     CHOL/HDL Ratio 4.1 07/20/2021 05:27 PM     Cholesterol/HDL ratio 3.9 10/22/2020 08:49 AM            ALT (SGPT)   Date Value Ref Range Status   07/20/2021 86 (H) 16 - 61 U/L Final            AST (SGOT)   Date Value Ref Range Status   07/20/2021 55 (H) 10 - 38 U/L Final      The 10-year ASCVD risk score (Estefania Martínez., et al., 2013) is: 49.1%    Values used to calculate the score:      Age: 78 years      Sex: Male      Is Non- : No      Diabetic: Yes      Tobacco smoker: No      Systolic Blood Pressure: 360 mmHg      Is BP treated: Yes      HDL Cholesterol: 51 MG/DL      Total Cholesterol: 208 MG/DL      Hyperlipidemia Goal: Patient has a 10-yr ASCVD risk of 49.1% with DM, HTN, LDL>100 and is likely a candidate for high-intensity statin therapy based on updated guidelines.     2019 ADA Guidelines Age: 78 · >/= 36years old: 10-year ASCVD risk >20% - statin is recommended.      PLAN  The following are interventions that have been identified:  - Patient identified as having SUPD gap  - Will reach out to provider to consider initiating statin therapy (e.g. atorvastatin 40mg) for this patient  - Lab/followup per provider discretion     No patient out reach planned at this time.            Future Appointments   Date Time Provider Chloe Luther   11/19/2021  9:00 AM Lefty Lorenzo PA-C [facility] AMB         Zach Arrington, PharmD  [address] PGY1 Pharmacy Resident  [address] [place] [address]  Department toll free: [phone], option 2

## 2021-11-19 ENCOUNTER — OFFICE VISIT (OUTPATIENT)
Dept: FAMILY MEDICINE CLINIC | Age: 76
End: 2021-11-19

## 2021-11-19 ENCOUNTER — HOSPITAL ENCOUNTER (OUTPATIENT)
Dept: LAB | Age: 76
Discharge: HOME | End: 2021-11-19
Payer: MEDICARE

## 2021-11-19 VITALS
TEMPERATURE: 97 F | SYSTOLIC BLOOD PRESSURE: 135 MMHG | WEIGHT: 215 LBS | HEART RATE: 74 BPM | RESPIRATION RATE: 16 BRPM | HEIGHT: 69 IN | BODY MASS INDEX: 31.84 KG/M2 | DIASTOLIC BLOOD PRESSURE: 83 MMHG | OXYGEN SATURATION: 95 %

## 2021-11-19 DIAGNOSIS — F32.A DEPRESSION, UNSPECIFIED DEPRESSION TYPE: ICD-10-CM

## 2021-11-19 DIAGNOSIS — E78.00 HYPERCHOLESTEROLEMIA: ICD-10-CM

## 2021-11-19 DIAGNOSIS — E11.65 UNCONTROLLED TYPE 2 DIABETES MELLITUS WITH HYPERGLYCEMIA (HCC): ICD-10-CM

## 2021-11-19 DIAGNOSIS — Z00.00 ENCOUNTER FOR ANNUAL WELLNESS EXAM IN MEDICARE PATIENT: ICD-10-CM

## 2021-11-19 DIAGNOSIS — E29.1 HYPOGONADISM MALE: ICD-10-CM

## 2021-11-19 DIAGNOSIS — I10 ESSENTIAL HYPERTENSION: ICD-10-CM

## 2021-11-19 DIAGNOSIS — E55.9 VITAMIN D DEFICIENCY: ICD-10-CM

## 2021-11-19 DIAGNOSIS — E11.65 UNCONTROLLED TYPE 2 DIABETES MELLITUS WITH HYPERGLYCEMIA (HCC): Primary | ICD-10-CM

## 2021-11-19 LAB
25(OH)D3 SERPL-MCNC: 39.1 NG/ML (ref 30–100)
ALBUMIN SERPL-MCNC: 4 G/DL (ref 3.4–5)
ALBUMIN/GLOB SERPL: 1.1 {RATIO} (ref 0.8–1.7)
ALP SERPL-CCNC: 62 U/L (ref 45–117)
ALT SERPL-CCNC: 59 U/L (ref 16–61)
ANION GAP SERPL CALC-SCNC: 8 MMOL/L (ref 3–18)
APPEARANCE UR: CLEAR
AST SERPL-CCNC: 32 U/L (ref 10–38)
BACTERIA URNS QL MICRO: NEGATIVE /HPF
BASOPHILS # BLD: 0.1 K/UL (ref 0–0.1)
BASOPHILS NFR BLD: 1 % (ref 0–2)
BILIRUB SERPL-MCNC: 1.3 MG/DL (ref 0.2–1)
BILIRUB UR QL: NEGATIVE
BUN SERPL-MCNC: 28 MG/DL (ref 7–18)
BUN/CREAT SERPL: 15 (ref 12–20)
CALCIUM SERPL-MCNC: 9.1 MG/DL (ref 8.5–10.1)
CHLORIDE SERPL-SCNC: 98 MMOL/L (ref 100–111)
CHOLEST SERPL-MCNC: 182 MG/DL
CO2 SERPL-SCNC: 30 MMOL/L (ref 21–32)
COLOR UR: YELLOW
CREAT SERPL-MCNC: 1.87 MG/DL (ref 0.6–1.3)
CREAT UR-MCNC: 150 MG/DL (ref 30–125)
DIFFERENTIAL METHOD BLD: ABNORMAL
EOSINOPHIL # BLD: 0.5 K/UL (ref 0–0.4)
EOSINOPHIL NFR BLD: 5 % (ref 0–5)
EPITH CASTS URNS QL MICRO: NORMAL /LPF (ref 0–5)
ERYTHROCYTE [DISTWIDTH] IN BLOOD BY AUTOMATED COUNT: 13 % (ref 11.6–14.5)
EST. AVERAGE GLUCOSE BLD GHB EST-MCNC: 174 MG/DL
GLOBULIN SER CALC-MCNC: 3.6 G/DL (ref 2–4)
GLUCOSE SERPL-MCNC: 186 MG/DL (ref 74–99)
GLUCOSE UR STRIP.AUTO-MCNC: NEGATIVE MG/DL
HBA1C MFR BLD: 7.7 % (ref 4.2–5.6)
HCT VFR BLD AUTO: 50.9 % (ref 36–48)
HDLC SERPL-MCNC: 43 MG/DL (ref 40–60)
HDLC SERPL: 4.2 {RATIO} (ref 0–5)
HGB BLD-MCNC: 17 G/DL (ref 13–16)
HGB UR QL STRIP: NEGATIVE
IMM GRANULOCYTES # BLD AUTO: 0 K/UL (ref 0–0.04)
IMM GRANULOCYTES NFR BLD AUTO: 0 % (ref 0–0.5)
KETONES UR QL STRIP.AUTO: NEGATIVE MG/DL
LDLC SERPL CALC-MCNC: 100.8 MG/DL (ref 0–100)
LEUKOCYTE ESTERASE UR QL STRIP.AUTO: NEGATIVE
LIPID PROFILE,FLP: ABNORMAL
LYMPHOCYTES # BLD: 1.8 K/UL (ref 0.9–3.6)
LYMPHOCYTES NFR BLD: 21 % (ref 21–52)
MCH RBC QN AUTO: 29 PG (ref 24–34)
MCHC RBC AUTO-ENTMCNC: 33.4 G/DL (ref 31–37)
MCV RBC AUTO: 86.7 FL (ref 78–100)
MICROALBUMIN UR-MCNC: 47.2 MG/DL (ref 0–3)
MICROALBUMIN/CREAT UR-RTO: 315 MG/G (ref 0–30)
MONOCYTES # BLD: 1 K/UL (ref 0.05–1.2)
MONOCYTES NFR BLD: 12 % (ref 3–10)
NEUTS SEG # BLD: 5.4 K/UL (ref 1.8–8)
NEUTS SEG NFR BLD: 62 % (ref 40–73)
NITRITE UR QL STRIP.AUTO: NEGATIVE
NRBC # BLD: 0 K/UL (ref 0–0.01)
NRBC BLD-RTO: 0 PER 100 WBC
PH UR STRIP: 5.5 [PH] (ref 5–8)
PLATELET # BLD AUTO: 212 K/UL (ref 135–420)
PMV BLD AUTO: 10.3 FL (ref 9.2–11.8)
POTASSIUM SERPL-SCNC: 3.7 MMOL/L (ref 3.5–5.5)
PROT SERPL-MCNC: 7.6 G/DL (ref 6.4–8.2)
PROT UR STRIP-MCNC: 100 MG/DL
RBC # BLD AUTO: 5.87 M/UL (ref 4.35–5.65)
RBC #/AREA URNS HPF: NORMAL /HPF (ref 0–5)
SODIUM SERPL-SCNC: 136 MMOL/L (ref 136–145)
SP GR UR REFRACTOMETRY: 1.02 (ref 1–1.03)
TRIGL SERPL-MCNC: 191 MG/DL (ref ?–150)
UROBILINOGEN UR QL STRIP.AUTO: 0.2 EU/DL (ref 0.2–1)
VLDLC SERPL CALC-MCNC: 38.2 MG/DL
WBC # BLD AUTO: 8.8 K/UL (ref 4.6–13.2)
WBC URNS QL MICRO: NORMAL /HPF (ref 0–5)

## 2021-11-19 PROCEDURE — 2022F DILAT RTA XM EVC RTNOPTHY: CPT | Performed by: PHYSICIAN ASSISTANT

## 2021-11-19 PROCEDURE — G8754 DIAS BP LESS 90: HCPCS | Performed by: PHYSICIAN ASSISTANT

## 2021-11-19 PROCEDURE — 85025 COMPLETE CBC W/AUTO DIFF WBC: CPT

## 2021-11-19 PROCEDURE — 84403 ASSAY OF TOTAL TESTOSTERONE: CPT

## 2021-11-19 PROCEDURE — 3017F COLORECTAL CA SCREEN DOC REV: CPT | Performed by: PHYSICIAN ASSISTANT

## 2021-11-19 PROCEDURE — 3052F HG A1C>EQUAL 8.0%<EQUAL 9.0%: CPT | Performed by: PHYSICIAN ASSISTANT

## 2021-11-19 PROCEDURE — 80053 COMPREHEN METABOLIC PANEL: CPT

## 2021-11-19 PROCEDURE — 82043 UR ALBUMIN QUANTITATIVE: CPT

## 2021-11-19 PROCEDURE — G8752 SYS BP LESS 140: HCPCS | Performed by: PHYSICIAN ASSISTANT

## 2021-11-19 PROCEDURE — G9717 DOC PT DX DEP/BP F/U NT REQ: HCPCS | Performed by: PHYSICIAN ASSISTANT

## 2021-11-19 PROCEDURE — G0439 PPPS, SUBSEQ VISIT: HCPCS | Performed by: PHYSICIAN ASSISTANT

## 2021-11-19 PROCEDURE — G8417 CALC BMI ABV UP PARAM F/U: HCPCS | Performed by: PHYSICIAN ASSISTANT

## 2021-11-19 PROCEDURE — 81001 URINALYSIS AUTO W/SCOPE: CPT

## 2021-11-19 PROCEDURE — 82306 VITAMIN D 25 HYDROXY: CPT

## 2021-11-19 PROCEDURE — 80061 LIPID PANEL: CPT

## 2021-11-19 PROCEDURE — G8427 DOCREV CUR MEDS BY ELIG CLIN: HCPCS | Performed by: PHYSICIAN ASSISTANT

## 2021-11-19 PROCEDURE — 99214 OFFICE O/P EST MOD 30 MIN: CPT | Performed by: PHYSICIAN ASSISTANT

## 2021-11-19 PROCEDURE — 1101F PT FALLS ASSESS-DOCD LE1/YR: CPT | Performed by: PHYSICIAN ASSISTANT

## 2021-11-19 PROCEDURE — G8536 NO DOC ELDER MAL SCRN: HCPCS | Performed by: PHYSICIAN ASSISTANT

## 2021-11-19 PROCEDURE — 83036 HEMOGLOBIN GLYCOSYLATED A1C: CPT

## 2021-11-19 NOTE — PROGRESS NOTES
HISTORY OF PRESENT ILLNESS  Jaylene Chew is a 78 y.o. male. HPI   Pt is being seen for f/u on his DM2, htn, chol, hypogonadism, and depression. He is doing well on meds and is taking them all as prescribed. He is due for his colonoscopy but would like to wait until summer as he is able to get a ride easier then. He has his eye exam at [facility] 2 mo ago and we will get OV note from them but he states it was normal. He has been feeling ok. He was unable to take his trip he had planned to the [facility] a few mo ago but is going to try again in a few months. He got his COVID booster 2d ago and is doing fine. He is fasting for labs and has no other concerns or complaints. He is also due for his wellness. No Known Allergies    Current Outpatient Medications   Medication Sig    testosterone (AndroGeL) 20.25 mg/1.25 gram (1.62 %) gel APPLY 2 PUMPS DAILY    losartan-hydroCHLOROthiazide (HYZAAR) 100-12.5 mg per tablet TAKE 1 TABLET EVERY DAY    Trulicity 1.28 GJ/6.4 mL sub-q pen INJECT 1 PEN SUBCUTANEOUSLY EVERY 7 DAYS    cholecalciferol (VITAMIN D3) (2,000 UNITS /50 MCG) cap capsule TAKE 1 CAPSULE EVERY DAY    amLODIPine (NORVASC) 10 mg tablet TAKE 1 TABLET EVERY DAY    citalopram (CELEXA) 20 mg tablet Take 1 Tablet by mouth daily.  levocetirizine (XYZAL) 5 mg tablet Take 1 Tab by mouth daily.  allopurinoL (ZYLOPRIM) 300 mg tablet Take 0.5 Tabs by mouth Three (3) times a week. Indications: 1/2 tab three times a week    aspirin delayed-release 81 mg tablet Take  by mouth daily. No current facility-administered medications for this visit. Review of Systems   Constitutional: Negative. Negative for chills, fever and malaise/fatigue. HENT: Negative. Negative for congestion, ear pain, sore throat and tinnitus. Eyes: Negative. Negative for blurred vision, double vision and photophobia. Respiratory: Negative. Negative for cough, shortness of breath and wheezing.     Cardiovascular: Negative. Negative for chest pain, palpitations and leg swelling. Gastrointestinal: Negative. Negative for abdominal pain, heartburn, nausea and vomiting. Genitourinary: Negative. Negative for dysuria, frequency, hematuria and urgency. Musculoskeletal: Negative. Negative for back pain, joint pain, myalgias and neck pain. Skin: Negative. Negative for itching and rash. Neurological: Negative. Negative for dizziness, tingling, tremors and headaches. Psychiatric/Behavioral: Positive for depression (controlled on meds). Negative for memory loss. The patient is nervous/anxious (controlled on meds) and has insomnia (controlled on meds). Visit Vitals  /83 (BP 1 Location: Left upper arm, BP Patient Position: Sitting)   Pulse 74   Temp 97 °F (36.1 °C) (Temporal)   Resp 16   Ht 5' 9\" (1.753 m)   Wt 215 lb (97.5 kg)   SpO2 95%   BMI 31.75 kg/m²       Physical Exam  Constitutional:       General: He is not in acute distress. Appearance: Normal appearance. He is well-developed. He is not ill-appearing or diaphoretic. HENT:      Head: Normocephalic and atraumatic. Right Ear: Tympanic membrane normal.      Left Ear: Tympanic membrane normal.      Nose: Nose normal.   Cardiovascular:      Rate and Rhythm: Normal rate and regular rhythm. Heart sounds: Normal heart sounds. Pulmonary:      Effort: Pulmonary effort is normal.      Breath sounds: Normal breath sounds. Skin:     General: Skin is warm and dry. Neurological:      General: No focal deficit present. Mental Status: He is alert and oriented to person, place, and time. Psychiatric:         Mood and Affect: Mood normal.         Behavior: Behavior normal.         Thought Content: Thought content normal.         Judgment: Judgment normal.         ASSESSMENT and PLAN    ICD-10-CM ICD-9-CM    1.  Uncontrolled type 2 diabetes mellitus with hyperglycemia ([facility])  S96.31 042.32 METABOLIC PANEL, COMPREHENSIVE      HEMOGLOBIN A1C WITH EAG      MICROALBUMIN, UR, RAND W/ MICROALB/CREAT RATIO   2. Vitamin D deficiency  E55.9 268.9 VITAMIN D, 25 HYDROXY   3. Essential hypertension  I10 401.9 CBC WITH AUTOMATED DIFF      URINALYSIS W/ RFLX MICROSCOPIC   4. Depression, unspecified depression type  F32. A 311    5. Hypogonadism male  E29.1 257.2 CBC WITH AUTOMATED DIFF      TESTOSTERONE, FREE & TOTAL   6. Hypercholesterolemia  V92.72 893.9 METABOLIC PANEL, COMPREHENSIVE      LIPID PANEL   7. Encounter for annual wellness exam in Medicare patient  Z00.00 V70.0      Follow-up and Dispositions    · Return in about 4 months (around 3/19/2022) for follow up. Pt expressed understanding of visit summary and plans for any follow ups or referrals as well as any medications prescribed. Medicare Wellness Visit  Severa Coombes is a 78 y.o. male and presents for annual Medicare Wellness Visit. Problem List: Reviewed with patient and discussed risk factors. Patient Active Problem List   Diagnosis Code    Controlled type 2 diabetes mellitus with diabetic polyneuropathy, without long-term current use of insulin ([facility] [address] E11.42    Essential hypertension I10    Idiopathic gout M10.00    CKD (chronic kidney disease) N18.9    Reactive depression F32.9    Primary insomnia F51.01    History of rectal cancer Z85.048    Controlled type 2 diabetes mellitus without complication, without long-term current use of insulin (HCC) E11.9    Chronic gout without tophus M1A. 9XX0    Testicular dysfunction E29.9    Type 2 diabetes mellitus with nephropathy (HCC) E11.21    Allergic rhinitis J30.9    Vitamin D deficiency W17.4    Periumbilical hernia B24.8       Current medical providers:  Patient Care Team:  Tio Muñiz PA-C as PCP - General (Physician Assistant)  Tio Muñiz PA-C [facility]    Noah Lockhart: Reviewed with patient  Past Surgical History:   Procedure Laterality Date    HX COLONOSCOPY      UT ABDOMEN SURGERY PROC UNLISTED          SH: Reviewed with patient  Social History     Tobacco Use    Smoking status: Never Smoker    Smokeless tobacco: Never Used   Substance Use Topics    Alcohol use: No    Drug use: Not on file       FH: Reviewed with patient  Family History   Problem Relation Age of Onset    Cancer Mother     No Known Problems Father        Medications/Allergies: Reviewed with patient  Current Outpatient Medications on File Prior to Visit   Medication Sig Dispense Refill    testosterone (AndroGeL) 20.25 mg/1.25 gram (1.62 %) gel APPLY 2 PUMPS DAILY 3 Each 0    losartan-hydroCHLOROthiazide (HYZAAR) 100-12.5 mg per tablet TAKE 1 TABLET EVERY DAY 90 Tablet 0    Trulicity 5.14 NC/3.3 mL sub-q pen INJECT 1 PEN SUBCUTANEOUSLY EVERY 7 DAYS 12 Each 2    cholecalciferol (VITAMIN D3) (2,000 UNITS /50 MCG) cap capsule TAKE 1 CAPSULE EVERY DAY 90 Capsule 0    amLODIPine (NORVASC) 10 mg tablet TAKE 1 TABLET EVERY DAY 90 Tablet 0    citalopram (CELEXA) 20 mg tablet Take 1 Tablet by mouth daily. 90 Tablet 1    levocetirizine (XYZAL) 5 mg tablet Take 1 Tab by mouth daily. 90 Tab 1    allopurinoL (ZYLOPRIM) 300 mg tablet Take 0.5 Tabs by mouth Three (3) times a week. Indications: 1/2 tab three times a week 30 Tab 1    aspirin delayed-release 81 mg tablet Take  by mouth daily. No current facility-administered medications on file prior to visit. No Known Allergies    Objective:  Visit Vitals  /83 (BP 1 Location: Left upper arm, BP Patient Position: Sitting)   Pulse 74   Temp 97 °F (36.1 °C) (Temporal)   Resp 16   Ht 5' 9\" (1.753 m)   Wt 215 lb (97.5 kg)   SpO2 95%   BMI 31.75 kg/m²    Body mass index is 31.75 kg/m².     Assessment of cognitive impairment: Alert and oriented x 3    Depression Screen:   3 most recent PHQ Screens 7/20/2021   Little interest or pleasure in doing things Several days   Feeling down, depressed, irritable, or hopeless Several days   Total Score PHQ 2 2 Trouble falling or staying asleep, or sleeping too much -   Feeling tired or having little energy -   Poor appetite, weight loss, or overeating -   Feeling bad about yourself - or that you are a failure or have let yourself or your family down -   Trouble concentrating on things such as school, work, reading, or watching TV -   Moving or speaking so slowly that other people could have noticed; or the opposite being so fidgety that others notice -   Thoughts of being better off dead, or hurting yourself in some way -   PHQ 9 Score -       Fall Risk Assessment:    Fall Risk Assessment, last 12 mths 7/20/2021   Able to walk? Yes   Fall in past 12 months? 0   Do you feel unsteady? 0   Are you worried about falling 0       Functional Ability:   Does the patient exhibit a steady gait? yes   How long did it take the patient to get up and walk from a sitting position? <10sec   Is the patient self reliant?  (ie can do own laundry, meals, household chores)  yes     Does the patient handle his/her own medications? yes     Does the patient handle his/her own money? yes     Is the patients home safe (ie good lighting, handrails on stairs and bath, etc.)? yes     Did you notice or did patient express any hearing difficulties? no     Did you notice or did patient express any vision difficulties?   no     Were distance and reading eye charts used? No - had eye exam at [facility] 2mo ago and will get OV note.        Advance Care Planning:   Patient was offered the opportunity to discuss advance care planning:  yes     Does patient have an Advance Directive:  yes   If no, did you provide information on Caring Connections?  no       Plan:      Orders Placed This Encounter    METABOLIC PANEL, COMPREHENSIVE    CBC WITH AUTOMATED DIFF    HEMOGLOBIN A1C WITH EAG    VITAMIN D, 25 HYDROXY    LIPID PANEL    TESTOSTERONE, FREE & TOTAL    MICROALBUMIN, UR, RAND W/ MICROALB/CREAT RATIO    URINALYSIS W/ RFLX MICROSCOPIC Health Maintenance   Topic Date Due    Shingrix Vaccine Age 49> (1 of 2) Never done    Foot Exam Q1  04/29/2020    MICROALBUMIN Q1  04/29/2020    Eye Exam Retinal or Dilated  07/01/2021    Flu Vaccine (1) 09/01/2021    Colorectal Cancer Screening Combo  07/01/2022    A1C test (Diabetic or Prediabetic)  07/20/2022    Lipid Screen  07/20/2022    Medicare Yearly Exam  11/20/2022    DTaP/Tdap/Td series (2 - Td or Tdap) 04/21/2027    Hepatitis C Screening  Completed    COVID-19 Vaccine  Completed    Pneumococcal 65+ years  Completed       *Patient verbalized understanding and agreement with the plan. A copy of the After Visit Summary with personalized health plan was given to the patient today.

## 2021-11-19 NOTE — PATIENT INSTRUCTIONS
Schedule of Personalized Health Plan  (Provide Copy to Patient)  The best way to stay healthy is to live a healthy lifestyle. A healthy lifestyle includes regular exercise, eating a well-balanced diet, keeping a healthy weight and not smoking. Regular physical exams and screening tests are another important way to take care of yourself. Preventive exams provided by health care providers can find health problems early when treatment works best and can keep you from getting certain diseases or illnesses. Preventive services include exams, lab tests, screenings, shots, monitoring and information to help you take care of your own health. All people over 65 should have a pneumonia shot. Pneumonia shots are usually only needed once in a lifetime unless your doctor decides differently. All people over 65 should have a yearly flu shot. People over 65 are at medium to high risk for Hepatitis B. Three shots are needed for complete protection. In addition to your physical exam, some screening tests are recommended:    Bone mass measurement (dexa scan) is recommended every two years if you have certain risk factors, such as personal history of vertebral fracture or chronic steroid medication use    Diabetes Mellitus screening is recommended every year. Glaucoma is an eye disease caused by high pressure in the eye. An eye exam is recommended every year. Cardiovascular screening tests that check your cholesterol and other blood fat (lipid) levels are recommended every five years. Colorectal Cancer screening tests help to find pre-cancerous polyps (growths in the colon) so they can be removed before they turn into cancer. Tests ordered for screening depend on your personal and family history risk factors.     Screening for Prostate Cancer is recommended yearly with a digital rectal exam and/or a PSA test    Here is a list of your current Health Maintenance items with a due date:  Health Maintenance Topic Date Due    Shingrix Vaccine Age 49> (1 of 2) Never done    Foot Exam Q1  04/29/2020    MICROALBUMIN Q1  04/29/2020    Eye Exam Retinal or Dilated  07/01/2021    Flu Vaccine (1) 09/01/2021    Colorectal Cancer Screening Combo  07/01/2022    A1C test (Diabetic or Prediabetic)  07/20/2022    Lipid Screen  07/20/2022    Medicare Yearly Exam  11/20/2022    DTaP/Tdap/Td series (2 - Td or Tdap) 04/21/2027    Hepatitis C Screening  Completed    COVID-19 Vaccine  Completed    Pneumococcal 65+ years  Completed

## 2021-11-19 NOTE — PROGRESS NOTES
Sada Chahal is a 78 y.o.  male presents today for office visit for follow up. Pt would also like to discuss DM. Pt is not fasting. Pt is in Room # 6      1. Have you been to the ER, urgent care clinic since your last visit? Hospitalized since your last visit? No    2. Have you seen or consulted any other health care providers outside of the [address] [place] [address] since your last visit? Include any pap smears or colon screening. No    Upcoming Appts  none    Health Maintenance reviewed    VORB: No orders of the defined types were placed in this encounter.   BERKLEY Mckeon-Susanna Summers LPN

## 2021-11-19 NOTE — LETTER
Patient Education     Si ivy hijo tiene faringitis o amigdalitis  A ivy hijo le duele la garganta. Es posible que esto se deba al enrojecimiento y a la hinchazón (inflamación) de la jocelyne. La garganta se inflama con mayor frecuencia en dos áreas. Estas son la faringe y las amígdalas. La inflamación de la faringe (faringitis) y la inflamación de las amígdalas (amigdalitis) son muy comunes en los niños. Esta hoja le indica lo que usted puede hacer para aliviar el dolor de garganta de ivy hijo.     ¿Cuáles son las causas de la faringitis o la amigdalitis?  En la mayoría de los casos, la faringitis y la amigdalitis se deben a infecciones virales o bacterianas.   ¿Cuáles son los síntomas de la faringitis o la amigdalitis?  El principal síntoma en ambos casos es el dolor de garganta. Además, el jimmy podría tener fiebre, enrojecimiento o hinchazón de la garganta y dificultad para tragar. Puede sentirle bultos irregulares en el miesha.   ¿Cómo se diagnostica la faringitis o la amigdalitis?  El proveedor de atención médica examinará la garganta de ivy hijo. El proveedor de atención médica podría obtener satish muestra de la garganta con un hisopo. El hisopo se enviará a analizar para danna si contiene las bacterias causantes de satish infección llamada “amigdalitis estreptocócica”. Si es necesario, puede realizarse un análisis de stephon para detectar infecciones virales elsa la mononucleosis.   ¿Cómo se trata la faringitis o la amigdalitis?  Si el dolor de garganta de ivy hijo se debe a satish infección bacteriana, el proveedor de atención médica podría recetarle antibióticos. En deonte contrario, puede tratar el dolor de garganta del jimmy en ivy casa. Cómo hacerlo:   · Déle a ivy hijo paracetamol o ibuprofeno para aliviarle el dolor. No use ibuprofeno para bebés menores de 6 meses de edad ni para niños que estén deshidratados o vomitando todo el tiempo. Pregunte al proveedor de atención médica de ivy hijo cuánto medicamento debe darle y  11/19/2021 9:54 AM    Mr. Severa Coombes  [facility] [id number]      To Whom It May Concern:    Severa Coombes is currently under the care of [address] [place] [address]. He has no contraindications to dental cleanings. If there are questions or concerns please have the patient contact our office.         Sincerely,      Juan Alberto Devi PA-C cuándo.  · No se debe administrar aspirina para aliviar la fiebre. La administración de aspirinas para bajar la fiebre puede producir satish afección grave denominada síndrome de Reye.  · Déle a ivy hijo líquidos frescos para beber.  · Indique a ivy hijo que tiffany gárgaras de agua salada tibia, si le ayudan a aliviar el dolor.  · Puede probar un aerosol anestésico de venta mare.  Siempre hable con el proveedor de atención médica de ivy hijo antes de darle cualquier medicamento de venta mare, sobre todo si es la primera vez que el jimmy lo usa.   ¿Cuáles son los asuntos que hay que abordar a cheikh plazo?  Si ivy hijo tiene olesya de garganta a menudo, llévelo a danna a un proveedor de atención médica. Los problemas recurrentes del jimmy también podrían aliviarse sacándole las amígdalas.   Cuándo debe llamar al proveedor de atención médica de ivy hijo  Llame de inmediato al proveedor de atención médica de ivy hijo si el jimmy tiene alguno de estos síntomas:   · Fiebre (consulte “La fiebre y los niños”, a continuación)  · Dolor de garganta persistente por 2 o 3 días  · Dolor de garganta con fiebre, dolor de noe, dolor de estómago o erupción  · Dificultad para girar o enderezar la noe  Cuándo llamar al 911  Llame al 911  de inmediato si ivy hijo tiene cualquiera de los siguientes síntomas:   · Dificultad para tragar o babeo  · Dificultad para respirar, o necesidad de inclinarse hacia rajinder para hacerlo  · Problemas para abrir la boca por completo  · Problemas para hablar  · Piel de color azulado, destiny o miriam  · Pérdida del conocimiento o problemas para caminar  · Sensación de desvanecimiento o mareos  · Falta de aliento y ritmo cardíaco acelerado    La fiebre y los niños  Use un termómetro digital para clara la temperatura de ivy hijo. No use un termómetro de gio. Existen distintos tipos de termómetros digitales. Estos incluyen jos para la boca, el oído, la frente (temporal), el recto o la axila. La temperatura tomada  en el oído no es precisa antes de los 6 meses de edad. No tome la temperatura por boca hasta que ivy hijo tenga, por lo menos, 4 años de edad.   Use el termómetro rectal con cuidado, ya que puede perforar el recto accidentalmente y transmitir microbios de las heces. Siga las instrucciones del fabricante del producto para usarlo adecuadamente. Si no se siente cómodo usando un termómetro rectal, use jos de otro tipo. Cuando hable con el proveedor de atención médica de ivy hijo, infórmele qué tipo usó para clara la temperatura del jimmy.   A continuación, hay valores de referencia que lo ayudarán a saber si ivy hijo tiene fiebre. Es posible que el proveedor de atención médica de ivy hijo le dé valores diferentes.   Bebé mohan de 3 meses:  · Priscilla, pregúntele al proveedor de atención médica de ivy hijo cómo debe tomarle la temperatura.  · En el recto o en la frente: 100,4 °F (38 °C) o más benny  · En la axila: 99 °F (37,2 °C) o más benny  Jimmy de 3 a 36 meses (3 años):  · En el recto, la frente o el oído: 102 °F (38,9 °C) o más benny  · En la axila: 101 °F (38,3 °C) o más benny  Llame al proveedor de atención médica en los siguientes casos:  · Picos de fiebre reiterados de 104 °F (40 °C) o más  · Fiebre que dura más de 24 horas en un jimmy mohan de 2 años  · Fiebre que dura 3 días en un jimmy de 2 años o más  © 3202-5821 [employer]. All rights reserved. This information is not intended as a substitute for professional medical care. Always follow your healthcare professional's instructions.

## 2021-11-21 LAB
TESTOST FREE SERPL-MCNC: 7.3 PG/ML (ref 6.6–18.1)
TESTOST SERPL-MCNC: 312 NG/DL (ref 264–916)

## 2021-11-23 NOTE — PROGRESS NOTES
Chief Complaint   Patient presents with    Diabetes    Hypertension   1. Have you been to the ER, urgent care clinic since your last visit? Hospitalized since your last visit? No    2. Have you seen or consulted any other health care providers outside of the [address] [place] [address] since your last visit? Include any pap smears or colon screening.  No Spoke with patient he is willing to try norco again please send

## 2021-11-29 ENCOUNTER — TELEPHONE (OUTPATIENT)
Dept: PHARMACY | Age: 76
End: 2021-11-29

## 2021-11-29 DIAGNOSIS — J30.9 ALLERGIC RHINITIS, UNSPECIFIED SEASONALITY, UNSPECIFIED TRIGGER: ICD-10-CM

## 2021-11-29 DIAGNOSIS — E55.9 VITAMIN D DEFICIENCY: ICD-10-CM

## 2021-11-29 DIAGNOSIS — M1A.9XX0 CHRONIC GOUT WITHOUT TOPHUS, UNSPECIFIED CAUSE, UNSPECIFIED SITE: ICD-10-CM

## 2021-11-29 DIAGNOSIS — I10 ESSENTIAL HYPERTENSION: ICD-10-CM

## 2021-11-29 NOTE — TELEPHONE ENCOUNTER
Continuing from 11/9/21 Encounter    Reviewed last progress note from office visit 11/19/2021 - no mention of starting a statin after provider reachout about patient being a potential statin candidate.     =============================================================    For Pharmacy [address] in place: No   Recommendation Provided To: Provider: 1 via Note to Provider    Gap Closed?: No   Time Spent (min): 30

## 2021-11-30 RX ORDER — AMLODIPINE BESYLATE 10 MG/1
TABLET ORAL
Qty: 90 TABLET | Refills: 0 | Status: SHIPPED | OUTPATIENT
Start: 2021-11-30 | End: 2022-02-25

## 2021-11-30 RX ORDER — LEVOCETIRIZINE DIHYDROCHLORIDE 5 MG/1
TABLET, FILM COATED ORAL
Qty: 90 TABLET | Refills: 1 | Status: SHIPPED | OUTPATIENT
Start: 2021-11-30 | End: 2022-05-19 | Stop reason: SDUPTHER

## 2021-11-30 RX ORDER — ACETAMINOPHEN 500 MG
TABLET ORAL
Qty: 90 CAPSULE | Refills: 0 | Status: SHIPPED | OUTPATIENT
Start: 2021-11-30 | End: 2022-02-25

## 2021-11-30 RX ORDER — ALLOPURINOL 300 MG/1
TABLET ORAL
Qty: 20 TABLET | Refills: 0 | Status: SHIPPED | OUTPATIENT
Start: 2021-11-30 | End: 2022-01-05

## 2021-12-06 DIAGNOSIS — N18.9 CHRONIC KIDNEY DISEASE, UNSPECIFIED CKD STAGE: ICD-10-CM

## 2021-12-06 DIAGNOSIS — E11.65 UNCONTROLLED TYPE 2 DIABETES MELLITUS WITH HYPERGLYCEMIA (HCC): ICD-10-CM

## 2021-12-08 RX ORDER — DULAGLUTIDE 0.75 MG/.5ML
INJECTION, SOLUTION SUBCUTANEOUS
Qty: 12 EACH | Refills: 2 | Status: SHIPPED | OUTPATIENT
Start: 2021-12-08 | End: 2022-05-19 | Stop reason: SDUPTHER

## 2021-12-11 DIAGNOSIS — I10 ESSENTIAL HYPERTENSION: ICD-10-CM

## 2021-12-13 RX ORDER — LOSARTAN POTASSIUM AND HYDROCHLOROTHIAZIDE 12.5; 1 MG/1; MG/1
TABLET ORAL
Qty: 90 TABLET | Refills: 0 | Status: SHIPPED | OUTPATIENT
Start: 2021-12-13 | End: 2022-02-25

## 2021-12-28 DIAGNOSIS — F32.A DEPRESSION, UNSPECIFIED DEPRESSION TYPE: ICD-10-CM

## 2021-12-28 RX ORDER — CITALOPRAM 20 MG/1
20 TABLET, FILM COATED ORAL DAILY
Qty: 90 TABLET | Refills: 1 | Status: SHIPPED | OUTPATIENT
Start: 2021-12-28 | End: 2022-05-19 | Stop reason: SDUPTHER

## 2021-12-28 NOTE — TELEPHONE ENCOUNTER
Requested Prescriptions     Pending Prescriptions Disp Refills    citalopram (CELEXA) 20 mg tablet 90 Tablet 1     Sig: Take 1 Tablet by mouth daily.

## 2022-01-04 DIAGNOSIS — E29.9 TESTICULAR DYSFUNCTION: ICD-10-CM

## 2022-01-04 DIAGNOSIS — M1A.9XX0 CHRONIC GOUT WITHOUT TOPHUS, UNSPECIFIED CAUSE, UNSPECIFIED SITE: ICD-10-CM

## 2022-01-05 DIAGNOSIS — E29.9 TESTICULAR DYSFUNCTION: ICD-10-CM

## 2022-01-05 RX ORDER — TESTOSTERONE 20.25 MG/1.25G
GEL TOPICAL
Qty: 3 EACH | Refills: 0 | Status: CANCELLED | OUTPATIENT
Start: 2022-01-05

## 2022-01-05 NOTE — TELEPHONE ENCOUNTER
Requested Prescriptions     Pending Prescriptions Disp Refills    testosterone (AndroGeL) 20.25 mg/1.25 gram (1.62 %) gel 3 Each 0     Sig: APPLY 2 PUMPS DAILY

## 2022-01-07 RX ORDER — TESTOSTERONE 20.25 MG/1.25G
GEL TOPICAL
Qty: 3 EACH | Refills: 0 | Status: SHIPPED | OUTPATIENT
Start: 2022-01-07 | End: 2022-05-19 | Stop reason: SDUPTHER

## 2022-01-07 RX ORDER — ALLOPURINOL 300 MG/1
TABLET ORAL
Qty: 20 TABLET | Refills: 0 | Status: SHIPPED | OUTPATIENT
Start: 2022-01-07 | End: 2022-05-19

## 2022-02-22 DIAGNOSIS — E55.9 VITAMIN D DEFICIENCY: ICD-10-CM

## 2022-02-22 DIAGNOSIS — I10 ESSENTIAL HYPERTENSION: ICD-10-CM

## 2022-02-25 RX ORDER — LOSARTAN POTASSIUM AND HYDROCHLOROTHIAZIDE 12.5; 1 MG/1; MG/1
TABLET ORAL
Qty: 90 TABLET | Refills: 0 | Status: SHIPPED | OUTPATIENT
Start: 2022-02-25 | End: 2022-05-19 | Stop reason: SDUPTHER

## 2022-02-25 RX ORDER — AMLODIPINE BESYLATE 10 MG/1
TABLET ORAL
Qty: 90 TABLET | Refills: 0 | Status: SHIPPED | OUTPATIENT
Start: 2022-02-25 | End: 2022-04-06

## 2022-02-25 RX ORDER — ACETAMINOPHEN 500 MG
TABLET ORAL
Qty: 90 CAPSULE | Refills: 0 | Status: SHIPPED | OUTPATIENT
Start: 2022-02-25 | End: 2022-05-19 | Stop reason: SDUPTHER

## 2022-03-18 PROBLEM — E11.9 CONTROLLED TYPE 2 DIABETES MELLITUS WITHOUT COMPLICATION, WITHOUT LONG-TERM CURRENT USE OF INSULIN (HCC): Status: ACTIVE | Noted: 2017-07-25

## 2022-03-18 PROBLEM — E29.9 TESTICULAR DYSFUNCTION: Status: ACTIVE | Noted: 2017-07-28

## 2022-03-19 PROBLEM — J30.9 ALLERGIC RHINITIS: Status: ACTIVE | Noted: 2019-04-29

## 2022-03-19 PROBLEM — N18.9 CKD (CHRONIC KIDNEY DISEASE): Status: ACTIVE | Noted: 2017-05-22

## 2022-03-19 PROBLEM — E11.42 CONTROLLED TYPE 2 DIABETES MELLITUS WITH DIABETIC POLYNEUROPATHY, WITHOUT LONG-TERM CURRENT USE OF INSULIN (HCC): Status: ACTIVE | Noted: 2017-05-22

## 2022-03-19 PROBLEM — E11.21 TYPE 2 DIABETES MELLITUS WITH NEPHROPATHY (HCC): Status: ACTIVE | Noted: 2018-01-26

## 2022-03-19 PROBLEM — K42.9 PERIUMBILICAL HERNIA: Status: ACTIVE | Noted: 2019-04-29

## 2022-03-19 PROBLEM — E55.9 VITAMIN D DEFICIENCY: Status: ACTIVE | Noted: 2019-04-29

## 2022-03-19 PROBLEM — F51.01 PRIMARY INSOMNIA: Status: ACTIVE | Noted: 2017-05-22

## 2022-03-19 PROBLEM — Z85.048 HISTORY OF RECTAL CANCER: Status: ACTIVE | Noted: 2017-05-22

## 2022-03-19 PROBLEM — I10 ESSENTIAL HYPERTENSION: Status: ACTIVE | Noted: 2017-05-22

## 2022-03-19 PROBLEM — M1A.9XX0 CHRONIC GOUT WITHOUT TOPHUS: Status: ACTIVE | Noted: 2017-07-25

## 2022-03-19 PROBLEM — M10.00 IDIOPATHIC GOUT: Status: ACTIVE | Noted: 2017-05-22

## 2022-03-20 PROBLEM — F32.9 REACTIVE DEPRESSION: Status: ACTIVE | Noted: 2017-05-22

## 2022-04-05 DIAGNOSIS — I10 ESSENTIAL HYPERTENSION: ICD-10-CM

## 2022-04-06 RX ORDER — AMLODIPINE BESYLATE 10 MG/1
TABLET ORAL
Qty: 90 TABLET | Refills: 0 | Status: SHIPPED | OUTPATIENT
Start: 2022-04-06 | End: 2022-05-19 | Stop reason: SDUPTHER

## 2022-05-19 ENCOUNTER — HOSPITAL ENCOUNTER (OUTPATIENT)
Dept: LAB | Age: 77
Discharge: HOME | End: 2022-05-19
Payer: MEDICARE

## 2022-05-19 ENCOUNTER — OFFICE VISIT (OUTPATIENT)
Dept: FAMILY MEDICINE CLINIC | Age: 77
End: 2022-05-19
Payer: MEDICARE

## 2022-05-19 VITALS
DIASTOLIC BLOOD PRESSURE: 73 MMHG | HEIGHT: 69 IN | OXYGEN SATURATION: 97 % | SYSTOLIC BLOOD PRESSURE: 119 MMHG | BODY MASS INDEX: 32.14 KG/M2 | TEMPERATURE: 97.3 F | HEART RATE: 77 BPM | RESPIRATION RATE: 17 BRPM | WEIGHT: 217 LBS

## 2022-05-19 DIAGNOSIS — E11.42 CONTROLLED TYPE 2 DIABETES MELLITUS WITH DIABETIC POLYNEUROPATHY, WITHOUT LONG-TERM CURRENT USE OF INSULIN (HCC): Primary | ICD-10-CM

## 2022-05-19 DIAGNOSIS — Z12.5 PROSTATE CANCER SCREENING: ICD-10-CM

## 2022-05-19 DIAGNOSIS — N18.9 CHRONIC KIDNEY DISEASE, UNSPECIFIED CKD STAGE: ICD-10-CM

## 2022-05-19 DIAGNOSIS — E11.42 CONTROLLED TYPE 2 DIABETES MELLITUS WITH DIABETIC POLYNEUROPATHY, WITHOUT LONG-TERM CURRENT USE OF INSULIN (HCC): ICD-10-CM

## 2022-05-19 DIAGNOSIS — I10 ESSENTIAL HYPERTENSION: ICD-10-CM

## 2022-05-19 DIAGNOSIS — F32.A DEPRESSION, UNSPECIFIED DEPRESSION TYPE: ICD-10-CM

## 2022-05-19 DIAGNOSIS — E29.1 HYPOGONADISM MALE: ICD-10-CM

## 2022-05-19 DIAGNOSIS — E11.65 UNCONTROLLED TYPE 2 DIABETES MELLITUS WITH HYPERGLYCEMIA (HCC): ICD-10-CM

## 2022-05-19 DIAGNOSIS — E78.00 HYPERCHOLESTEROLEMIA: ICD-10-CM

## 2022-05-19 DIAGNOSIS — E29.9 TESTICULAR DYSFUNCTION: ICD-10-CM

## 2022-05-19 DIAGNOSIS — E55.9 VITAMIN D DEFICIENCY: ICD-10-CM

## 2022-05-19 DIAGNOSIS — J30.9 ALLERGIC RHINITIS, UNSPECIFIED SEASONALITY, UNSPECIFIED TRIGGER: ICD-10-CM

## 2022-05-19 LAB
25(OH)D3 SERPL-MCNC: 42.1 NG/ML (ref 30–100)
ALBUMIN SERPL-MCNC: 3.8 G/DL (ref 3.4–5)
ALBUMIN/GLOB SERPL: 1.1 {RATIO} (ref 0.8–1.7)
ALP SERPL-CCNC: 62 U/L (ref 45–117)
ALT SERPL-CCNC: 54 U/L (ref 16–61)
ANION GAP SERPL CALC-SCNC: 5 MMOL/L (ref 3–18)
AST SERPL-CCNC: 31 U/L (ref 10–38)
BASOPHILS # BLD: 0 K/UL (ref 0–0.1)
BASOPHILS NFR BLD: 0 % (ref 0–2)
BILIRUB SERPL-MCNC: 0.9 MG/DL (ref 0.2–1)
BUN SERPL-MCNC: 26 MG/DL (ref 7–18)
BUN/CREAT SERPL: 15 (ref 12–20)
CALCIUM SERPL-MCNC: 9.2 MG/DL (ref 8.5–10.1)
CHLORIDE SERPL-SCNC: 100 MMOL/L (ref 100–111)
CHOLEST SERPL-MCNC: 175 MG/DL
CO2 SERPL-SCNC: 31 MMOL/L (ref 21–32)
CREAT SERPL-MCNC: 1.79 MG/DL (ref 0.6–1.3)
DIFFERENTIAL METHOD BLD: ABNORMAL
EOSINOPHIL # BLD: 0.4 K/UL (ref 0–0.4)
EOSINOPHIL NFR BLD: 3 % (ref 0–5)
ERYTHROCYTE [DISTWIDTH] IN BLOOD BY AUTOMATED COUNT: 13.7 % (ref 11.6–14.5)
EST. AVERAGE GLUCOSE BLD GHB EST-MCNC: 180 MG/DL
GLOBULIN SER CALC-MCNC: 3.6 G/DL (ref 2–4)
GLUCOSE SERPL-MCNC: 162 MG/DL (ref 74–99)
HBA1C MFR BLD: 7.9 % (ref 4.2–5.6)
HCT VFR BLD AUTO: 48.1 % (ref 36–48)
HDLC SERPL-MCNC: 47 MG/DL (ref 40–60)
HDLC SERPL: 3.7 {RATIO} (ref 0–5)
HGB BLD-MCNC: 15.8 G/DL (ref 13–16)
IMM GRANULOCYTES # BLD AUTO: 0 K/UL (ref 0–0.04)
IMM GRANULOCYTES NFR BLD AUTO: 0 % (ref 0–0.5)
LDLC SERPL CALC-MCNC: 93.4 MG/DL (ref 0–100)
LIPID PROFILE,FLP: ABNORMAL
LYMPHOCYTES # BLD: 1.9 K/UL (ref 0.9–3.6)
LYMPHOCYTES NFR BLD: 18 % (ref 21–52)
MCH RBC QN AUTO: 28.9 PG (ref 24–34)
MCHC RBC AUTO-ENTMCNC: 32.8 G/DL (ref 31–37)
MCV RBC AUTO: 87.9 FL (ref 78–100)
MONOCYTES # BLD: 0.8 K/UL (ref 0.05–1.2)
MONOCYTES NFR BLD: 7 % (ref 3–10)
NEUTS SEG # BLD: 7.6 K/UL (ref 1.8–8)
NEUTS SEG NFR BLD: 71 % (ref 40–73)
NRBC # BLD: 0 K/UL (ref 0–0.01)
NRBC BLD-RTO: 0 PER 100 WBC
PLATELET # BLD AUTO: 209 K/UL (ref 135–420)
PMV BLD AUTO: 10.3 FL (ref 9.2–11.8)
POTASSIUM SERPL-SCNC: 3.9 MMOL/L (ref 3.5–5.5)
PROT SERPL-MCNC: 7.4 G/DL (ref 6.4–8.2)
PSA SERPL-MCNC: 2.3 NG/ML (ref 0–4)
RBC # BLD AUTO: 5.47 M/UL (ref 4.35–5.65)
SODIUM SERPL-SCNC: 136 MMOL/L (ref 136–145)
TRIGL SERPL-MCNC: 173 MG/DL (ref ?–150)
VLDLC SERPL CALC-MCNC: 34.6 MG/DL
WBC # BLD AUTO: 10.7 K/UL (ref 4.6–13.2)

## 2022-05-19 PROCEDURE — 84153 ASSAY OF PSA TOTAL: CPT

## 2022-05-19 PROCEDURE — G8754 DIAS BP LESS 90: HCPCS | Performed by: PHYSICIAN ASSISTANT

## 2022-05-19 PROCEDURE — 1101F PT FALLS ASSESS-DOCD LE1/YR: CPT | Performed by: PHYSICIAN ASSISTANT

## 2022-05-19 PROCEDURE — G8752 SYS BP LESS 140: HCPCS | Performed by: PHYSICIAN ASSISTANT

## 2022-05-19 PROCEDURE — 82306 VITAMIN D 25 HYDROXY: CPT

## 2022-05-19 PROCEDURE — 80053 COMPREHEN METABOLIC PANEL: CPT

## 2022-05-19 PROCEDURE — 85025 COMPLETE CBC W/AUTO DIFF WBC: CPT

## 2022-05-19 PROCEDURE — 2022F DILAT RTA XM EVC RTNOPTHY: CPT | Performed by: PHYSICIAN ASSISTANT

## 2022-05-19 PROCEDURE — 36415 COLL VENOUS BLD VENIPUNCTURE: CPT

## 2022-05-19 PROCEDURE — G8417 CALC BMI ABV UP PARAM F/U: HCPCS | Performed by: PHYSICIAN ASSISTANT

## 2022-05-19 PROCEDURE — G8427 DOCREV CUR MEDS BY ELIG CLIN: HCPCS | Performed by: PHYSICIAN ASSISTANT

## 2022-05-19 PROCEDURE — 3046F HEMOGLOBIN A1C LEVEL >9.0%: CPT | Performed by: PHYSICIAN ASSISTANT

## 2022-05-19 PROCEDURE — 84403 ASSAY OF TOTAL TESTOSTERONE: CPT

## 2022-05-19 PROCEDURE — G9717 DOC PT DX DEP/BP F/U NT REQ: HCPCS | Performed by: PHYSICIAN ASSISTANT

## 2022-05-19 PROCEDURE — G8536 NO DOC ELDER MAL SCRN: HCPCS | Performed by: PHYSICIAN ASSISTANT

## 2022-05-19 PROCEDURE — 3017F COLORECTAL CA SCREEN DOC REV: CPT | Performed by: PHYSICIAN ASSISTANT

## 2022-05-19 PROCEDURE — 83036 HEMOGLOBIN GLYCOSYLATED A1C: CPT

## 2022-05-19 PROCEDURE — 99214 OFFICE O/P EST MOD 30 MIN: CPT | Performed by: PHYSICIAN ASSISTANT

## 2022-05-19 PROCEDURE — 80061 LIPID PANEL: CPT

## 2022-05-19 RX ORDER — AMLODIPINE BESYLATE 10 MG/1
10 TABLET ORAL DAILY
Qty: 90 TABLET | Refills: 1 | Status: SHIPPED | OUTPATIENT
Start: 2022-05-19 | End: 2022-10-07 | Stop reason: SDUPTHER

## 2022-05-19 RX ORDER — LEVOCETIRIZINE DIHYDROCHLORIDE 5 MG/1
TABLET, FILM COATED ORAL
Qty: 90 TABLET | Refills: 1 | Status: SHIPPED | OUTPATIENT
Start: 2022-05-19 | End: 2022-10-07 | Stop reason: SDUPTHER

## 2022-05-19 RX ORDER — DULAGLUTIDE 0.75 MG/.5ML
INJECTION, SOLUTION SUBCUTANEOUS
Qty: 12 EACH | Refills: 1 | Status: SHIPPED | OUTPATIENT
Start: 2022-05-19 | End: 2022-10-07 | Stop reason: SDUPTHER

## 2022-05-19 RX ORDER — TESTOSTERONE 20.25 MG/1.25G
GEL TOPICAL
Qty: 3 EACH | Refills: 1 | Status: SHIPPED | OUTPATIENT
Start: 2022-05-19 | End: 2022-10-07 | Stop reason: SDUPTHER

## 2022-05-19 RX ORDER — ACETAMINOPHEN 500 MG
TABLET ORAL
Qty: 90 CAPSULE | Refills: 1 | Status: SHIPPED | OUTPATIENT
Start: 2022-05-19 | End: 2022-10-07 | Stop reason: SDUPTHER

## 2022-05-19 RX ORDER — LOSARTAN POTASSIUM AND HYDROCHLOROTHIAZIDE 12.5; 1 MG/1; MG/1
1 TABLET ORAL DAILY
Qty: 90 TABLET | Refills: 1 | Status: SHIPPED | OUTPATIENT
Start: 2022-05-19 | End: 2022-10-07 | Stop reason: SDUPTHER

## 2022-05-19 RX ORDER — CITALOPRAM 20 MG/1
20 TABLET, FILM COATED ORAL DAILY
Qty: 90 TABLET | Refills: 1 | Status: SHIPPED | OUTPATIENT
Start: 2022-05-19 | End: 2022-10-07 | Stop reason: SDUPTHER

## 2022-05-19 NOTE — PATIENT INSTRUCTIONS
DASH Diet: Care Instructions  Your Care Instructions     The DASH diet is an eating plan that can help lower your blood pressure. DASH stands for Dietary Approaches to Stop Hypertension. Hypertension is high blood pressure. The DASH diet focuses on eating foods that are high in calcium, potassium, and magnesium. These nutrients can lower blood pressure. The foods that are highest in these nutrients are fruits, vegetables, low-fat dairy products, nuts, seeds, and legumes. But taking calcium, potassium, and magnesium supplements instead of eating foods that are high in those nutrients does not have the same effect. The DASH diet also includes whole grains, fish, and poultry. The DASH diet is one of several lifestyle changes your doctor may recommend to lower your high blood pressure. Your doctor may also want you to decrease the amount of sodium in your diet. Lowering sodium while following the DASH diet can lower blood pressure even further than just the DASH diet alone. Follow-up care is a key part of your treatment and safety. Be sure to make and go to all appointments, and call your doctor if you are having problems. It's also a good idea to know your test results and keep a list of the medicines you take. How can you care for yourself at home? Following the DASH diet  · Eat 4 to 5 servings of fruit each day. A serving is 1 medium-sized piece of fruit, ½ cup chopped or canned fruit, 1/4 cup dried fruit, or 4 ounces (½ cup) of fruit juice. Choose fruit more often than fruit juice. · Eat 4 to 5 servings of vegetables each day. A serving is 1 cup of lettuce or raw leafy vegetables, ½ cup of chopped or cooked vegetables, or 4 ounces (½ cup) of vegetable juice. Choose vegetables more often than vegetable juice. · Get 2 to 3 servings of low-fat and fat-free dairy each day. A serving is 8 ounces of milk, 1 cup of yogurt, or 1 ½ ounces of cheese. · Eat 6 to 8 servings of grains each day.  A serving is 1 slice of bread, 1 ounce of dry cereal, or ½ cup of cooked rice, pasta, or cooked cereal. Try to choose whole-grain products as much as possible. · Limit lean meat, poultry, and fish to 2 servings each day. A serving is 3 ounces, about the size of a deck of cards. · Eat 4 to 5 servings of nuts, seeds, and legumes (cooked dried beans, lentils, and split peas) each week. A serving is 1/3 cup of nuts, 2 tablespoons of seeds, or ½ cup of cooked beans or peas. · Limit fats and oils to 2 to 3 servings each day. A serving is 1 teaspoon of vegetable oil or 2 tablespoons of salad dressing. · Limit sweets and added sugars to 5 servings or less a week. A serving is 1 tablespoon jelly or jam, ½ cup sorbet, or 1 cup of lemonade. · Eat less than 2,300 milligrams (mg) of sodium a day. If you limit your sodium to 1,500 mg a day, you can lower your blood pressure even more. · Be aware that all of these are the suggested number of servings for people who eat 1,800 to 2,000 calories a day. Your recommended number of servings may be different if you need more or fewer calories. Tips for success  · Start small. Do not try to make dramatic changes to your diet all at once. You might feel that you are missing out on your favorite foods and then be more likely to not follow the plan. Make small changes, and stick with them. Once those changes become habit, add a few more changes. · Try some of the following:  ? Make it a goal to eat a fruit or vegetable at every meal and at snacks. This will make it easy to get the recommended amount of fruits and vegetables each day. ? Try yogurt topped with fruit and nuts for a snack or healthy dessert. ? Add lettuce, tomato, cucumber, and onion to sandwiches. ? Combine a ready-made pizza crust with low-fat mozzarella cheese and lots of vegetable toppings. Try using tomatoes, squash, spinach, broccoli, carrots, cauliflower, and onions. ?  Have a variety of cut-up vegetables with a low-fat dip as an appetizer instead of chips and dip. ? Sprinkle sunflower seeds or chopped almonds over salads. Or try adding chopped walnuts or almonds to cooked vegetables. ? Try some vegetarian meals using beans and peas. Add garbanzo or kidney beans to salads. Make burritos and tacos with mashed do beans or black beans. Where can you learn more? Go to http://www.layne.com/  Enter H967 in the search box to learn more about \"DASH Diet: Care Instructions. \"  Current as of: January 10, 2022               Content Version: 13.2  © 8430-1968 [employer]. Care instructions adapted under license by [employer] (which disclaims liability or warranty for this information). If you have questions about a medical condition or this instruction, always ask your healthcare professional. [address] any warranty or liability for your use of this information.

## 2022-05-19 NOTE — PROGRESS NOTES
HISTORY OF PRESENT ILLNESS  Eder Myers is a 78 y.o. male. HPI   Pt is being seen for f/u on his DM2, htn, vit D, cholesterol, depression, and testosterone. He has been doing well. He had a good trip to [facility] and has a cruise planned to [facility] as well as possibly a trip to see the [place]. He still feels down and is missing his wife but it is manageable and he denies any thoughts of harming himself or anyone else. He is fasting for labs and needs refills on all his meds. No Known Allergies    Current Outpatient Medications   Medication Sig    amLODIPine (NORVASC) 10 mg tablet Take 1 Tablet by mouth daily.  citalopram (CELEXA) 20 mg tablet Take 1 Tablet by mouth daily.  cholecalciferol (VITAMIN D3) (2,000 UNITS /50 MCG) cap capsule TAKE 1 CAPSULE EVERY DAY    levocetirizine (XYZAL) 5 mg tablet TAKE 1 TABLET EVERY DAY    losartan-hydroCHLOROthiazide (HYZAAR) 100-12.5 mg per tablet Take 1 Tablet by mouth daily.  testosterone (AndroGeL) 20.25 mg/1.25 gram (1.62 %) gel APPLY 2 PUMPS DAILY    dulaglutide (Trulicity) 0.15 BA/3.6 mL sub-q pen INJECT 0.75MG (1 PEN) SUBCUTANEOUSLY EVERY 7 DAYS     No current facility-administered medications for this visit. Review of Systems   Constitutional: Negative. Negative for chills, fever and malaise/fatigue. HENT: Negative. Negative for congestion, ear pain, sore throat and tinnitus. Eyes: Negative. Negative for blurred vision, double vision and photophobia. Respiratory: Negative. Negative for cough, shortness of breath and wheezing. Cardiovascular: Negative. Negative for chest pain, palpitations and leg swelling. Gastrointestinal: Negative. Negative for abdominal pain, heartburn, nausea and vomiting. Genitourinary: Negative. Negative for dysuria, frequency, hematuria and urgency. Musculoskeletal: Negative. Negative for back pain, joint pain, myalgias and neck pain. Skin: Negative. Negative for itching and rash. Neurological: Negative. Negative for dizziness, tingling, tremors and headaches. Psychiatric/Behavioral: Positive for depression (controlled on meds). Negative for memory loss. The patient is nervous/anxious (controlled on meds) and has insomnia (controlled on meds). Visit Vitals  /73 (BP 1 Location: Right upper arm, BP Patient Position: Sitting, BP Cuff Size: Adult)   Pulse 77   Temp 97.3 °F (36.3 °C) (Temporal)   Resp 17   Ht 5' 9\" (1.753 m)   Wt 217 lb (98.4 kg)   SpO2 97%   BMI 32.05 kg/m²       Physical Exam  Constitutional:       General: He is not in acute distress. Appearance: Normal appearance. He is well-developed. He is not ill-appearing or diaphoretic. HENT:      Head: Normocephalic and atraumatic. Right Ear: Tympanic membrane normal.      Left Ear: Tympanic membrane normal.      Nose: Nose normal.   Cardiovascular:      Rate and Rhythm: Normal rate and regular rhythm. Pulses: Normal pulses. Heart sounds: Normal heart sounds. Pulmonary:      Effort: Pulmonary effort is normal.      Breath sounds: Normal breath sounds. Skin:     General: Skin is warm and dry. Neurological:      General: No focal deficit present. Mental Status: He is alert and oriented to person, place, and time. Psychiatric:         Mood and Affect: Mood normal.         Behavior: Behavior normal.         Thought Content: Thought content normal.         Judgment: Judgment normal.         ASSESSMENT and PLAN    ICD-10-CM ICD-9-CM    1. Controlled type 2 diabetes mellitus with diabetic polyneuropathy, without long-term current use of insulin ([facility])  X07.02 959.10 METABOLIC PANEL, COMPREHENSIVE     357.2 HEMOGLOBIN A1C WITH EAG   2. Vitamin D deficiency  E55.9 268.9 VITAMIN D, 25 HYDROXY      cholecalciferol (VITAMIN D3) (2,000 UNITS /50 MCG) cap capsule   3. Depression, unspecified depression type  F32. A 311 citalopram (CELEXA) 20 mg tablet   4.  Hypercholesterolemia  Q39.52 108.9 METABOLIC PANEL, COMPREHENSIVE      LIPID PANEL   5. Essential hypertension  D64 996.4 METABOLIC PANEL, COMPREHENSIVE      CBC WITH AUTOMATED DIFF      amLODIPine (NORVASC) 10 mg tablet      losartan-hydroCHLOROthiazide (HYZAAR) 100-12.5 mg per tablet   6. Prostate cancer screening  Z12.5 V76.44 PSA SCREENING (SCREENING)   7. Hypogonadism male  E29.1 257.2 TESTOSTERONE, FREE & TOTAL   8. Allergic rhinitis, unspecified seasonality, unspecified trigger  J30.9 477.9 levocetirizine (XYZAL) 5 mg tablet   9. Testicular dysfunction  E29.9 257.9 testosterone (AndroGeL) 20.25 mg/1.25 gram (1.62 %) gel   10. Uncontrolled type 2 diabetes mellitus with hyperglycemia (HCC)  E11.65 250.02 dulaglutide (Trulicity) 4.93 LD/6.6 mL sub-q pen   11. Chronic kidney disease, unspecified CKD stage  N18.9 585.9 dulaglutide (Trulicity) 6.70 AR/8.7 mL sub-q pen     Follow-up and Dispositions    · Return in about 6 months (around 11/19/2022) for follow up. Pt expressed understanding of visit summary and plans for any follow ups or referrals as well as any medications prescribed.

## 2022-05-22 LAB
TESTOST FREE SERPL-MCNC: 4.9 PG/ML (ref 6.6–18.1)
TESTOST SERPL-MCNC: 333 NG/DL (ref 264–916)

## 2022-05-26 RX ORDER — ALLOPURINOL 300 MG/1
TABLET ORAL
Qty: 20 TABLET | Refills: 1 | Status: SHIPPED | OUTPATIENT
Start: 2022-05-26 | End: 2022-10-07 | Stop reason: SDUPTHER

## 2022-10-07 ENCOUNTER — OFFICE VISIT (OUTPATIENT)
Dept: FAMILY MEDICINE CLINIC | Age: 77
End: 2022-10-07
Payer: MEDICARE

## 2022-10-07 VITALS
TEMPERATURE: 97.3 F | SYSTOLIC BLOOD PRESSURE: 133 MMHG | RESPIRATION RATE: 17 BRPM | OXYGEN SATURATION: 97 % | HEART RATE: 78 BPM | BODY MASS INDEX: 31.55 KG/M2 | WEIGHT: 213 LBS | DIASTOLIC BLOOD PRESSURE: 77 MMHG | HEIGHT: 69 IN

## 2022-10-07 DIAGNOSIS — E29.9 TESTICULAR DYSFUNCTION: ICD-10-CM

## 2022-10-07 DIAGNOSIS — F32.A DEPRESSION, UNSPECIFIED DEPRESSION TYPE: ICD-10-CM

## 2022-10-07 DIAGNOSIS — J30.9 ALLERGIC RHINITIS, UNSPECIFIED SEASONALITY, UNSPECIFIED TRIGGER: ICD-10-CM

## 2022-10-07 DIAGNOSIS — N18.9 CHRONIC KIDNEY DISEASE, UNSPECIFIED CKD STAGE: ICD-10-CM

## 2022-10-07 DIAGNOSIS — E11.42 CONTROLLED TYPE 2 DIABETES MELLITUS WITH DIABETIC POLYNEUROPATHY, WITHOUT LONG-TERM CURRENT USE OF INSULIN (HCC): Primary | ICD-10-CM

## 2022-10-07 DIAGNOSIS — E55.9 VITAMIN D DEFICIENCY: ICD-10-CM

## 2022-10-07 DIAGNOSIS — M1A.9XX0 CHRONIC GOUT WITHOUT TOPHUS, UNSPECIFIED CAUSE, UNSPECIFIED SITE: ICD-10-CM

## 2022-10-07 DIAGNOSIS — I10 ESSENTIAL HYPERTENSION: ICD-10-CM

## 2022-10-07 DIAGNOSIS — E11.65 UNCONTROLLED TYPE 2 DIABETES MELLITUS WITH HYPERGLYCEMIA (HCC): ICD-10-CM

## 2022-10-07 DIAGNOSIS — N18.30 STAGE 3 CHRONIC KIDNEY DISEASE, UNSPECIFIED WHETHER STAGE 3A OR 3B CKD (HCC): ICD-10-CM

## 2022-10-07 LAB — HBA1C MFR BLD HPLC: 7.3 %

## 2022-10-07 PROCEDURE — 2022F DILAT RTA XM EVC RTNOPTHY: CPT | Performed by: PHYSICIAN ASSISTANT

## 2022-10-07 PROCEDURE — G8417 CALC BMI ABV UP PARAM F/U: HCPCS | Performed by: PHYSICIAN ASSISTANT

## 2022-10-07 PROCEDURE — G9717 DOC PT DX DEP/BP F/U NT REQ: HCPCS | Performed by: PHYSICIAN ASSISTANT

## 2022-10-07 PROCEDURE — G8536 NO DOC ELDER MAL SCRN: HCPCS | Performed by: PHYSICIAN ASSISTANT

## 2022-10-07 PROCEDURE — 1123F ACP DISCUSS/DSCN MKR DOCD: CPT | Performed by: PHYSICIAN ASSISTANT

## 2022-10-07 PROCEDURE — 1101F PT FALLS ASSESS-DOCD LE1/YR: CPT | Performed by: PHYSICIAN ASSISTANT

## 2022-10-07 PROCEDURE — G8752 SYS BP LESS 140: HCPCS | Performed by: PHYSICIAN ASSISTANT

## 2022-10-07 PROCEDURE — 3051F HG A1C>EQUAL 7.0%<8.0%: CPT | Performed by: PHYSICIAN ASSISTANT

## 2022-10-07 PROCEDURE — 3017F COLORECTAL CA SCREEN DOC REV: CPT | Performed by: PHYSICIAN ASSISTANT

## 2022-10-07 PROCEDURE — G8427 DOCREV CUR MEDS BY ELIG CLIN: HCPCS | Performed by: PHYSICIAN ASSISTANT

## 2022-10-07 PROCEDURE — 99214 OFFICE O/P EST MOD 30 MIN: CPT | Performed by: PHYSICIAN ASSISTANT

## 2022-10-07 PROCEDURE — 83036 HEMOGLOBIN GLYCOSYLATED A1C: CPT | Performed by: PHYSICIAN ASSISTANT

## 2022-10-07 PROCEDURE — G8754 DIAS BP LESS 90: HCPCS | Performed by: PHYSICIAN ASSISTANT

## 2022-10-07 RX ORDER — DULAGLUTIDE 0.75 MG/.5ML
INJECTION, SOLUTION SUBCUTANEOUS
Qty: 12 EACH | Refills: 3 | Status: SHIPPED | OUTPATIENT
Start: 2022-10-07 | End: 2022-11-01

## 2022-10-07 RX ORDER — AMLODIPINE BESYLATE 10 MG/1
10 TABLET ORAL DAILY
Qty: 90 TABLET | Refills: 3 | Status: SHIPPED | OUTPATIENT
Start: 2022-10-07

## 2022-10-07 RX ORDER — ACETAMINOPHEN 500 MG
TABLET ORAL
Qty: 90 CAPSULE | Refills: 3 | Status: SHIPPED | OUTPATIENT
Start: 2022-10-07

## 2022-10-07 RX ORDER — TESTOSTERONE 20.25 MG/1.25G
GEL TOPICAL
Qty: 3 EACH | Refills: 1 | Status: SHIPPED | OUTPATIENT
Start: 2022-10-07

## 2022-10-07 RX ORDER — ALLOPURINOL 300 MG/1
TABLET ORAL
Qty: 20 TABLET | Refills: 3 | Status: SHIPPED | OUTPATIENT
Start: 2022-10-07

## 2022-10-07 RX ORDER — CITALOPRAM 20 MG/1
20 TABLET, FILM COATED ORAL DAILY
Qty: 90 TABLET | Refills: 3 | Status: SHIPPED | OUTPATIENT
Start: 2022-10-07

## 2022-10-07 RX ORDER — LOSARTAN POTASSIUM AND HYDROCHLOROTHIAZIDE 12.5; 1 MG/1; MG/1
1 TABLET ORAL DAILY
Qty: 90 TABLET | Refills: 3 | Status: SHIPPED | OUTPATIENT
Start: 2022-10-07

## 2022-10-07 RX ORDER — LEVOCETIRIZINE DIHYDROCHLORIDE 5 MG/1
TABLET, FILM COATED ORAL
Qty: 90 TABLET | Refills: 3 | Status: SHIPPED | OUTPATIENT
Start: 2022-10-07

## 2022-10-07 NOTE — PROGRESS NOTES
HISTORY OF PRESENT ILLNESS  Darcy Sanz is a 78 y.o. male. HPI  Pt is being seen for f/u on his DM2, anxiety./depression, htn, and testosterone. His A1c is down to 7.3 and his BP is well controlled. His daughter is in need of a liver transplant for liver failure from alcohol use and he is having a hard time with that. He needs refills on all his meds and has no additional concerns. No Known Allergies    Current Outpatient Medications   Medication Sig    allopurinoL (ZYLOPRIM) 300 mg tablet TAKE 1/2 TABLET THREE TIMES WEEKLY    amLODIPine (NORVASC) 10 mg tablet Take 1 Tablet by mouth daily. cholecalciferol (VITAMIN D3) (2,000 UNITS /50 MCG) cap capsule TAKE 1 CAPSULE EVERY DAY    citalopram (CELEXA) 20 mg tablet Take 1 Tablet by mouth daily. dulaglutide (Trulicity) 9.70 UX/1.6 mL sub-q pen INJECT 0.75MG (1 PEN) SUBCUTANEOUSLY EVERY 7 DAYS    levocetirizine (XYZAL) 5 mg tablet TAKE 1 TABLET EVERY DAY    losartan-hydroCHLOROthiazide (HYZAAR) 100-12.5 mg per tablet Take 1 Tablet by mouth daily. testosterone (AndroGeL) 20.25 mg/1.25 gram (1.62 %) gel APPLY 2 PUMPS DAILY     No current facility-administered medications for this visit. Review of Systems   Constitutional: Negative. Negative for chills, fever and malaise/fatigue. HENT: Negative. Negative for congestion, ear pain, sore throat and tinnitus. Eyes: Negative. Negative for blurred vision, double vision and photophobia. Respiratory: Negative. Negative for cough, shortness of breath and wheezing. Cardiovascular: Negative. Negative for chest pain, palpitations and leg swelling. Gastrointestinal: Negative. Negative for abdominal pain, heartburn, nausea and vomiting. Genitourinary: Negative. Negative for dysuria, frequency, hematuria and urgency. Musculoskeletal: Negative. Negative for back pain, joint pain, myalgias and neck pain. Skin: Negative. Negative for itching and rash. Neurological: Negative.   Negative for dizziness, tingling, tremors and headaches. Psychiatric/Behavioral:  Positive for depression (controlled on meds). Negative for memory loss. The patient is nervous/anxious (controlled on meds) and has insomnia (controlled on meds). Visit Vitals  /77 (BP 1 Location: Right upper arm, BP Patient Position: Sitting, BP Cuff Size: Adult)   Pulse 78   Temp 97.3 °F (36.3 °C) (Temporal)   Resp 17   Ht 5' 9\" (1.753 m)   Wt 213 lb (96.6 kg)   SpO2 97%   BMI 31.45 kg/m²       Physical Exam  Constitutional:       General: He is not in acute distress. Appearance: Normal appearance. He is well-developed. He is not ill-appearing or diaphoretic. HENT:      Head: Normocephalic and atraumatic. Right Ear: Tympanic membrane normal.      Left Ear: Tympanic membrane normal.      Nose: Nose normal.   Cardiovascular:      Rate and Rhythm: Normal rate and regular rhythm. Pulses: Normal pulses. Heart sounds: Normal heart sounds. Pulmonary:      Effort: Pulmonary effort is normal.      Breath sounds: Normal breath sounds. Skin:     General: Skin is warm and dry. Neurological:      General: No focal deficit present. Mental Status: He is alert and oriented to person, place, and time. Psychiatric:         Mood and Affect: Mood normal.         Behavior: Behavior normal.         Thought Content: Thought content normal.         Judgment: Judgment normal.       ASSESSMENT and PLAN    ICD-10-CM ICD-9-CM    1. Controlled type 2 diabetes mellitus with diabetic polyneuropathy, without long-term current use of insulin ([facility])  E11.42 250.60 AMB POC HEMOGLOBIN A1C     357.2       2. Stage 3 chronic kidney disease, unspecified whether stage 3a or 3b CKD ([facility])  N18.30 585.3       3. Essential hypertension  I10 401.9 amLODIPine (NORVASC) 10 mg tablet      losartan-hydroCHLOROthiazide (HYZAAR) 100-12.5 mg per tablet      4.  Vitamin D deficiency  E55.9 268.9 cholecalciferol (VITAMIN D3) (2,000 UNITS /50 MCG) cap capsule      5. Depression, unspecified depression type  F32. A 311 citalopram (CELEXA) 20 mg tablet      6. Uncontrolled type 2 diabetes mellitus with hyperglycemia (HCC)  E11.65 250.02 dulaglutide (Trulicity) 4.93 CM/5.5 mL sub-q pen      7. Chronic kidney disease, unspecified CKD stage  N18.9 585.9 dulaglutide (Trulicity) 5.23 UU/3.2 mL sub-q pen      8. Allergic rhinitis, unspecified seasonality, unspecified trigger  J30.9 477.9 levocetirizine (XYZAL) 5 mg tablet      9. Testicular dysfunction  E29.9 257.9 testosterone (AndroGeL) 20.25 mg/1.25 gram (1.62 %) gel      10. Chronic gout without tophus, unspecified cause, unspecified site  M1A. 9XX0 274.02 allopurinoL (ZYLOPRIM) 300 mg tablet        Follow-up and Dispositions    Return in about 6 months (around 4/7/2023) for follow up. Pt expressed understanding of visit summary and plans for any follow ups or referrals as well as any medications prescribed.

## 2022-11-01 DIAGNOSIS — E11.65 UNCONTROLLED TYPE 2 DIABETES MELLITUS WITH HYPERGLYCEMIA (HCC): ICD-10-CM

## 2022-11-01 DIAGNOSIS — N18.9 CHRONIC KIDNEY DISEASE, UNSPECIFIED CKD STAGE: ICD-10-CM

## 2022-11-01 RX ORDER — DULAGLUTIDE 0.75 MG/.5ML
INJECTION, SOLUTION SUBCUTANEOUS
Qty: 12 EACH | Refills: 3 | Status: SHIPPED | OUTPATIENT
Start: 2022-11-01

## 2024-08-30 ENCOUNTER — TELEPHONE (OUTPATIENT)
Dept: PHARMACY | Facility: CLINIC | Age: 79
End: 2024-08-30

## 2024-08-30 NOTE — TELEPHONE ENCOUNTER
[facility]: ADHERENCE REVIEW  Identified care gap per [place] fills with [facility]  [facility]: Statin adherence    Medicare Advantage - MRMA  ACO  Per insurer report, LIS-0 - co-pays are based on tiers and patient is subject to coverage gap.  Patient also appears to be prescribed: ACE/ARB and Diabetes    ASSESSMENT    ACE/ARB ADHERENCE    Insurance Records claims through  24  (Prior Year PDC = 100% - PASSED ; YTD PDC = 100% - PASSED ):   LOSARTAN/HCT -12.5 MG last filled on 24 for 100 day supply. Next refill due: 10.04.24    Prescribed si tablet/capsule daily    Per Insurer Portal: last filled on 24 for 100 day supply.     Per [facility]: last picked up on 24 for 100 day supply. Billed through [facility] [facility]. 5 refills remaining.    BP Readings from Last 3 Encounters:   10/07/22 133/77   22 119/73   21 135/83     CrCl cannot be calculated (Patient's most recent lab result is older than the maximum 180 days allowed.).  Lab Results   Component Value Date    CREATININE 1.79 (H) 2022     Lab Results   Component Value Date    K 3.9 2022     DIABETES ADHERENCE    Insurance Records claims through  24  (Prior Year PDC = 93% - PASSED ; YTD PDC = 100% - PASSED ):   TRULICITY INJ 1.5/0.5 ML last filled on 24 for 84 day supply. Next refill due: 10.28.24    Prescribed sig: INJECT 1.5 MG (1 PEN) SUBCUTANEOUSLY EVERY 7 DAYS    Per Insurer Portal: last filled on 24 for 84 day supply.     Per [facility]: last picked up on 24 for 84 day supply. Billed through [facility]. 9 refills remaining.    Lab Results   Component Value Date    LABA1C 7.9 (H) 2022    LABA1C 7.7 (H) 2021    LABA1C 8.9 (H) 2021       STATIN ADHERENCE    Insurance Records claims through  24  (Prior Year PDC = not reported; YTD PDC = FIRST FILL; Potential Fail Date: 24):   ATORVASTATIN TAB 40 MG last filled on 24 for 30 day  place:  No  Gap Closed?: Yes   Time Spent (min): 20